# Patient Record
Sex: FEMALE | Race: ASIAN
[De-identification: names, ages, dates, MRNs, and addresses within clinical notes are randomized per-mention and may not be internally consistent; named-entity substitution may affect disease eponyms.]

---

## 2017-05-31 ENCOUNTER — HOSPITAL ENCOUNTER (OUTPATIENT)
Dept: HOSPITAL 45 - C.EDA | Age: 77
Setting detail: OBSERVATION
LOS: 2 days | Discharge: HOME HEALTH | End: 2017-06-02
Attending: HOSPITALIST | Admitting: HOSPITALIST
Payer: COMMERCIAL

## 2017-05-31 VITALS
TEMPERATURE: 97.34 F | DIASTOLIC BLOOD PRESSURE: 66 MMHG | OXYGEN SATURATION: 100 % | SYSTOLIC BLOOD PRESSURE: 109 MMHG | HEART RATE: 59 BPM

## 2017-05-31 VITALS
HEIGHT: 55 IN | WEIGHT: 69.89 LBS | WEIGHT: 69.89 LBS | HEIGHT: 55 IN | BODY MASS INDEX: 16.17 KG/M2 | BODY MASS INDEX: 16.17 KG/M2

## 2017-05-31 VITALS
HEART RATE: 67 BPM | DIASTOLIC BLOOD PRESSURE: 80 MMHG | SYSTOLIC BLOOD PRESSURE: 136 MMHG | TEMPERATURE: 97.52 F | OXYGEN SATURATION: 100 %

## 2017-05-31 DIAGNOSIS — E53.8: ICD-10-CM

## 2017-05-31 DIAGNOSIS — R63.4: ICD-10-CM

## 2017-05-31 DIAGNOSIS — E55.9: ICD-10-CM

## 2017-05-31 DIAGNOSIS — R10.9: ICD-10-CM

## 2017-05-31 DIAGNOSIS — Z90.710: ICD-10-CM

## 2017-05-31 DIAGNOSIS — Z79.899: ICD-10-CM

## 2017-05-31 DIAGNOSIS — R51: Primary | ICD-10-CM

## 2017-05-31 DIAGNOSIS — R11.2: ICD-10-CM

## 2017-05-31 DIAGNOSIS — Z90.49: ICD-10-CM

## 2017-05-31 DIAGNOSIS — R42: ICD-10-CM

## 2017-05-31 LAB
ALBUMIN/GLOB SERPL: 1 {RATIO} (ref 0.9–2)
ALP SERPL-CCNC: 63 U/L (ref 45–117)
ALT SERPL-CCNC: 27 U/L (ref 12–78)
ANION GAP SERPL CALC-SCNC: 11 MMOL/L (ref 3–11)
APPEARANCE UR: CLEAR
AST SERPL-CCNC: (no result) U/L (ref 15–37)
AST SERPL-CCNC: (no result) U/L (ref 15–37)
BASOPHILS # BLD: 0.03 K/UL (ref 0–0.2)
BASOPHILS NFR BLD: 0.3 %
BILIRUB UR-MCNC: (no result) MG/DL
BUN SERPL-MCNC: 13 MG/DL (ref 7–18)
BUN/CREAT SERPL: 15.6 (ref 10–20)
CALCIUM SERPL-MCNC: 9.2 MG/DL (ref 8.5–10.1)
CHLORIDE SERPL-SCNC: 108 MMOL/L (ref 98–107)
CO2 SERPL-SCNC: 23 MMOL/L (ref 21–32)
COLOR UR: YELLOW
COMPLETE: YES
CREAT CL PREDICTED SERPL C-G-VRATE: 30.2 ML/MIN
CREAT SERPL-MCNC: 0.85 MG/DL (ref 0.6–1.2)
EOSINOPHIL NFR BLD AUTO: 260 K/UL (ref 130–400)
GLOBULIN SER-MCNC: 4.3 GM/DL (ref 2.5–4)
GLUCOSE SERPL-MCNC: 107 MG/DL (ref 70–99)
HCT VFR BLD CALC: 41.3 % (ref 37–47)
IG%: 0.3 %
IMM GRANULOCYTES NFR BLD AUTO: 16.3 %
INR PPP: 0.9 (ref 0.9–1.1)
LYMPHOCYTES # BLD: 1.54 K/UL (ref 1.2–3.4)
MANUAL MICROSCOPIC REQUIRED?: NO
MCH RBC QN AUTO: 32.1 PG (ref 25–34)
MCHC RBC AUTO-ENTMCNC: 32.7 G/DL (ref 32–36)
MCV RBC AUTO: 98.3 FL (ref 80–100)
MONOCYTES NFR BLD: 5 %
NEUTROPHILS # BLD AUTO: 0.7 %
NEUTROPHILS NFR BLD AUTO: 77.4 %
NITRITE UR QL STRIP: (no result)
PARTIAL THROMBOPLASTIN RATIO: 0.9
PH UR STRIP: 5.5 [PH] (ref 4.5–7.5)
PMV BLD AUTO: 9.4 FL (ref 7.4–10.4)
POTASSIUM SERPL-SCNC: (no result) MMOL/L (ref 3.5–5.1)
POTASSIUM SERPL-SCNC: (no result) MMOL/L (ref 3.5–5.1)
POTASSIUM SERPL-SCNC: 3.6 MMOL/L (ref 3.5–5.1)
PROTHROMBIN TIME: 10 SECONDS (ref 9–12)
RBC # BLD AUTO: 4.2 M/UL (ref 4.2–5.4)
REVIEW REQ?: NO
SODIUM SERPL-SCNC: 142 MMOL/L (ref 136–145)
SP GR UR STRIP: 1.02 (ref 1–1.03)
TSH SERPL-ACNC: 1.05 UIU/ML (ref 0.3–4.5)
URINE BILL WITH OR WITHOUT MIC: (no result)
UROBILINOGEN UR-MCNC: (no result) MG/DL
WBC # BLD AUTO: 9.43 K/UL (ref 4.8–10.8)
ZZUR CULT IF INDIC CLEAN CATCH: NO

## 2017-05-31 RX ADMIN — CHOLESTYRAMINE SCH GM: 4 POWDER, FOR SUSPENSION ORAL at 22:00

## 2017-05-31 RX ADMIN — SODIUM CHLORIDE SCH MLS/HR: 900 INJECTION, SOLUTION INTRAVENOUS at 22:21

## 2017-05-31 NOTE — DIAGNOSTIC IMAGING REPORT
MR ANGIOGRAPHY OF THE [place] [facility] [place] NO CONTRAST



CLINICAL HISTORY: Acute stroke    



COMPARISON STUDY: None.



A 3-D time-of-flight MR angiographic sequence of the [place] [facility] [place] was

performed. Both the source and projection images were reviewed.



There is no evidence of major intracranial branch occlusion. There is no

evidence of intracranial stenosis. There are no lesions suspicious for aneurysm.



IMPRESSION: Unremarkable MR angiography of the [place].







Electronically signed by:  Cory Ojeda M.D.

5/31/2017 9:05 PM



Dictated Date/Time:  5/31/2017 9:03 PM

## 2017-05-31 NOTE — DIAGNOSTIC IMAGING REPORT
SINGLE VIEW CHEST



CLINICAL HISTORY:  Generalized weakness. Change in mental status.



FINDINGS: An AP, portable, upright chest radiograph is compared to study dated

8/5/2014. Correlation is made with chest CT dated 1/30/2014. The examination is

degraded by portable technique and patient rotation.  The heart is top normal

for projection and there is mild atherosclerotic calcification of the thoracic

aorta. Emphysema and chronic interstitial thickening are similar to previous. No

airspace consolidation or pleural effusion is identified. No pneumothorax is

seen. The skeletal structures are osteopenic. The bony thorax is grossly intact.



IMPRESSION: Emphysema with no acute cardiopulmonary abnormality.







Electronically signed by:  Sundar Krishnan M.D.

5/31/2017 4:40 PM



Dictated Date/Time:  5/31/2017 4:38 PM

## 2017-05-31 NOTE — EMERGENCY ROOM VISIT NOTE
History


Report prepared by Lori:  Christy Paulino


Under the Supervision of:  Dr. Sundar Cason M.D.


First contact with patient:  15:43


Chief Complaint:  VOMITING


Stated Complaint:  NAUSEA, VOMITING, HEADACHE, DIZZINESS





History of Present Illness


The patient is a 76 year old female who presents to the Emergency Room with 

complaints of persistent dizziness that began prior to arrival.  The patient 

reports that she was outside gardening today and became dizzy and shaky.  She 

states that she went into her house and began vomiting.  The patient has 

additionally associated eye pain, visual changes, chest pain, shortness of 

breath, and a headache.  She denies any loss of consciousness or head trauma.  

The patient states that she felt okay prior to gardening today, and reports 

eating lunch and breakfast.  She denies any recent illness.  The patient denies 

any abdominal pain





   Source of History:  patient


   Onset:  prior to arrival


   Position:  other (global)


   Quality:  other (dizziness)


   Timing:  other (persistent)


   Associated Symptoms:  + SOB, + chest pain, + headache, + vomiting, No LOC, 

No abdominal pain


Note:


Associated Symptoms: headache, eye pain, visual changes, shaky





Review of Systems


See HPI for pertinent positives & negatives. A total of 10 systems reviewed and 

were otherwise negative.





Past Medical & Surgical


Medical Problems:


(1) Abdominal adhesions


(2) Biliary colic


(3) Cholelithiasis


(4) SBO (small bowel obstruction)


Surgical Problems:


(1) S/P appendectomy


(2) S/P hysterectomy








Family History





Cancer





Social History


Smoking Status:  Never Smoker


Marital Status:  


Housing Status:  lives with family


Occupation Status:  retired





Current/Historical Medications


Scheduled


Alendronate Sodium (Alendronate Sodium), 70 MG PO WK


Ascorbic Acid (Ascorbic Acid), 1,000 MG PO DAILY


Cholecalciferol (Vitamin D 1000 Unit), 1,000 INTER.UNIT PO DAILY


Cholestyramine (Cholestyramine), 4 GM PO BID


Cyanocobalamin (Vitamin B12), 1,000 MCG PO DAILY


Estradiol (Estradiol Transdermal System), 1 PATCH TOP WK


Multiple Vitamins W/ Minerals (Womens 50+ Multi Vitamin), 1 TAB PO DAILY





Scheduled PRN


Butalbital-Acetaminophen-Caffe (Fioricet), 1 CAP PO TID PRN for Headache


Meloxicam (Mobic), 7.5 MG PO DAILY PRN for Pain


Methocarbamol (Methocarbamol), 375 MG PO HS PRN for Muscle Relaxer





Allergies


Coded Allergies:  


     Tetracycline (Verified  Allergy, Mild, UNKNOWN, 10/10/14)


     Codeine (Verified  Allergy, Unknown, UNKNOWN, 10/10/14)


     Sulfa Drugs (Verified  Allergy, Unknown, UNKNOWN, 10/10/14)


     Tramadol (Verified  Allergy, Unknown, UNKNOWN, 10/10/14)


     Aspirin (Verified  Adverse Reaction, Mild, GI UPSET, 10/2/14)


     NSAIDs (Verified  Adverse Reaction, Mild, GI UPSET, 10/10/14)





Physical Exam


Vital Signs











  Date Time  Temp Pulse Resp B/P Pulse Ox O2 Delivery O2 Flow Rate FiO2


 


5/31/17 18:53  73 16 111/53 100 Room Air  


 


5/31/17 17:52  75 18 113/51 100 Room Air  


 


5/31/17 17:25  72 14 125/46 100 Room Air  


 


5/31/17 16:09  73      


 


5/31/17 15:58 36.4 70 17 137/94 100 Room Air  


 


5/31/17 15:54     98 Room Air  











Physical Exam


GENERAL: Patient is in moderate distress secondary to vomiting.


HEENT: No acute trauma, normocephalic atraumatic, mucous membranes moist, no 

nasal congestion, no scleral icterus.


NECK: No stridor, no adenopathy, no meningismus, trachea is midline.


LUNGS: Clear to auscultation bilaterally, no wheeze, no rhonchi, breath sounds 

equal.


HEART: Without murmurs gallops or rubs, regular rate and rhythm.


ABDOMEN: Soft, nontender, bowel sounds positive, no hernias, no peritonitis.


EXTREMITIES: No cyanosis or edema, full range of motion of all the joints 

without pain or difficulty, no signs for acute trauma.


NEUROLOGIC: Oriented x 3, no acute motor or sensory deficits, no focal weakness.


SKIN: No rash, no jaundice, no diaphoresis.





Medical Decision & Procedures


ER Provider


Diagnostic Interpretation:


Radiology results as stated below per my review and radiologist interpretation:





CT SCAN OF THE BRAIN WITHOUT IV CONTRAST





CLINICAL HISTORY: Generalized weakness. Change in mental status.





COMPARISON STUDY:  MRI of the brain dated 5/11/2016.





TECHNIQUE: Unenhanced axial CT scan of the brain is performed from the vertex to


the skull base.





CT DOSE: 537.48 mGy.cm





FINDINGS:





Brain parenchyma: There are age-related involutional changes noting  mild


subcortical and periventricular microangiopathic change. There is no hemorrhage,


mass effect, or evidence of acute territorial ischemia by CT criteria.


Mineralization is noted in the basal ganglia. Gray-white matter is preserved. No


extra-axial fluid collection is seen.





Ventricles, sulci, cisterns: Prominent secondary to involutional change.





Intracranial vasculature: There is atherosclerotic calcification of the


cavernous carotid arteries.





Calvarium: Unremarkable.





Sinuses and mastoids: The visualized paranasal sinuses are clear. The mastoid


air cells are well pneumatized.





Orbits: The bony orbits are grossly intact. There is evidence of bilateral


ocular lens surgery.








IMPRESSION: There is no hemorrhage, mass effect, or evidence of acute


territorial ischemia by CT criteria.





Electronically signed by:  Sundar Krishnan M.D.


5/31/2017 4:24 PM





Dictated Date/Time:  5/31/2017 4:21 PM








SINGLE VIEW CHEST





CLINICAL HISTORY:  Generalized weakness. Change in mental status.





FINDINGS: An AP, portable, upright chest radiograph is compared to study dated


8/5/2014. Correlation is made with chest CT dated 1/30/2014. The examination is


degraded by portable technique and patient rotation.  The heart is top normal


for projection and there is mild atherosclerotic calcification of the thoracic


aorta. Emphysema and chronic interstitial thickening are similar to previous. No


airspace consolidation or pleural effusion is identified. No pneumothorax is


seen. The skeletal structures are osteopenic. The bony thorax is grossly intact.





IMPRESSION: Emphysema with no acute cardiopulmonary abnormality.





Electronically signed by:  Sundar Krishnan M.D.


5/31/2017 4:40 PM





Dictated Date/Time:  5/31/2017 4:38 PM





Laboratory Results


5/31/17 16:00








Red Blood Count 4.20, Mean Corpuscular Volume 98.3, Mean Corpuscular Hemoglobin 

32.1, Mean Corpuscular Hemoglobin Concent 32.7, Mean Platelet Volume 9.4, 

Neutrophils (%) (Auto) 77.4, Lymphocytes (%) (Auto) 16.3, Monocytes (%) (Auto) 

5.0, Eosinophils (%) (Auto) 0.7, Basophils (%) (Auto) 0.3, Neutrophils # (Auto) 

7.29, Lymphocytes # (Auto) 1.54, Monocytes # (Auto) 0.47, Eosinophils # (Auto) 

0.07, Basophils # (Auto) 0.03





5/31/17 16:00








5/31/17 18:11

















Test


  5/31/17


16:00 5/31/17


17:17 5/31/17


18:11


 


White Blood Count


  9.43 K/uL


(4.8-10.8) 


  


 


 


Red Blood Count


  4.20 M/uL


(4.2-5.4) 


  


 


 


Hemoglobin


  13.5 g/dL


(12.0-16.0) 


  


 


 


Hematocrit 41.3 % (37-47)   


 


Mean Corpuscular Volume


  98.3 fL


() 


  


 


 


Mean Corpuscular Hemoglobin


  32.1 pg


(25-34) 


  


 


 


Mean Corpuscular Hemoglobin


Concent 32.7 g/dl


(32-36) 


  


 


 


Platelet Count


  260 K/uL


(130-400) 


  


 


 


Mean Platelet Volume


  9.4 fL


(7.4-10.4) 


  


 


 


Neutrophils (%) (Auto) 77.4 %   


 


Lymphocytes (%) (Auto) 16.3 %   


 


Monocytes (%) (Auto) 5.0 %   


 


Eosinophils (%) (Auto) 0.7 %   


 


Basophils (%) (Auto) 0.3 %   


 


Neutrophils # (Auto)


  7.29 K/uL


(1.4-6.5) 


  


 


 


Lymphocytes # (Auto)


  1.54 K/uL


(1.2-3.4) 


  


 


 


Monocytes # (Auto)


  0.47 K/uL


(0.11-0.59) 


  


 


 


Eosinophils # (Auto)


  0.07 K/uL


(0-0.5) 


  


 


 


Basophils # (Auto)


  0.03 K/uL


(0-0.2) 


  


 


 


RDW Standard Deviation


  46.5 fL


(36.4-46.3) 


  


 


 


RDW Coefficient of Variation


  12.8 %


(11.5-14.5) 


  


 


 


Immature Granulocyte % (Auto) 0.3 %   


 


Immature Granulocyte # (Auto)


  0.03 K/uL


(0.00-0.02) 


  


 


 


Prothrombin Time


  10.0 SECONDS


(9.0-12.0) 


  


 


 


Prothromb Time International


Ratio 0.9 (0.9-1.1) 


  


  


 


 


Activated Partial


Thromboplast Time 22.7 SECONDS


(21.0-31.0) 


  


 


 


Partial Thromboplastin Ratio 0.9   


 


Anion Gap


  11.0 mmol/L


(3-11) 


  


 


 


Est Creatinine Clear Calc


Drug Dose 30.2 ml/min 


  


  


 


 


Estimated GFR (


American) 77.7 


  


  


 


 


Estimated GFR (Non-


American 67.0 


  


  


 


 


BUN/Creatinine Ratio 15.6 (10-20)   


 


Calcium Level


  9.2 mg/dl


(8.5-10.1) 


  


 


 


Total Bilirubin


  0.7 mg/dl


(0.2-1) 


  


 


 


Alanine Aminotransferase


(ALT/SGPT) 27 U/L (12-78) 


  


  


 


 


Alkaline Phosphatase


  63 U/L


() 


  


 


 


Troponin I


  0.017 ng/ml


(0-0.045) 


  


 


 


Total Protein


  8.5 gm/dl


(6.4-8.2) 


  


 


 


Albumin


  4.2 gm/dl


(3.4-5.0) 


  


 


 


Globulin


  4.3 gm/dl


(2.5-4.0) 


  


 


 


Albumin/Globulin Ratio 1.0 (0.9-2)   


 


Thyroid Stimulating Hormone


(TSH) 1.050 uIu/ml


(0.300-4.500) 


  


 


 


Aspartate Amino Transf


(AST/SGOT) 


   U/L (15-37) 


  


 


 


Chemistry Specimen Hemolysis    








Laboratory results reviewed by me.





Medications Administered











 Medications


  (Trade)  Dose


 Ordered  Sig/Albaro


 Route  Start Time


 Stop Time Status Last Admin


Dose Admin


 


 Sodium Chloride


  (Nss 1000ml)  500 ml @ 


 999 mls/hr  Q31M STAT


 IV  5/31/17 15:47


 5/31/17 16:17 DC 5/31/17 16:36


999 MLS/HR


 


 Ondansetron HCl


  (Zofran Inj)  4 mg  NOW  STAT


 IV  5/31/17 15:47


 5/31/17 15:51 DC 5/31/17 16:38


4 MG


 


 Lorazepam


  (Ativan Inj)  0.5 mg  NOW  STAT


 IV  5/31/17 15:47


 5/31/17 15:51 DC 5/31/17 16:38


0.5 MG











ECG


Indication:  chest pain


Rate (beats per minute):  78


Rhythm:  normal sinus


Findings:  no acute ischemic change, no ectopy, other (old inferior infarct)





ED Course


1544: The patient was evaluated in room A11B. A complete history and physical 

exam was performed.





1547: Ordered Ativan Inj 0.5 mg IV, Sodium Chloride 1000 ml @ 200 mls/hr IV, 

Zofran Inj 4 mg IV, Sodium Chloride 500 ml @ 999 mls/hr IV.





1801: I reevaluated the patient and she is feeling better, but still cannot get 

up and move around without having symptoms.  I discussed all the exam findings 

with her and I discussed the treatment plan.  She verbalized complete 

understanding and agreement.  She will be evaluated for further treatment.





1808: I discussed the patients case with MELVINA De Paz.  She is going to 

evaluate the patient for further treatment.





Medical Decision


The patient is a 76 year old female who presents to the ED with complaints of 

dizziness.  Differential diagnoses considered include cardiac ischemia, MI, 

vertigo, intracranial bleed, migraine, dehydration, exhaustion, electrolyte 

imbalance, anemia.





There is no leukocytosis or concerning anemia.  No significant electrolyte 

abnormality, kidney failure or hepatitis.  The patient appears to be in a 

euthyroid state.  Brain CT shows no acute bleed or mass effect.  EKG shows a 

normal sinus rhythm, no acute ischemia.  Cardiac enzyme testing times one is 

not consistent with acute cardiac injury.  Chest x-ray does not show 

mediastinal widening, pneumonia or pneumothorax.





Patient received IV saline, IV Zofran and IV Ativan, she feels better but still 

is very off balance and weak.  Any movement causes her feelings of balance and 

dizziness to worsen, she then becomes nauseated as well.





The patient likely has vertigo, posterior circulation stroke is also 

consideration, although, her history and exam does not warrant TPA 

administration.  Given her findings, given her exam, given her complaints, 

further workup in the hospital was felt warranted.  I did speak to the patient 

in to case management.  The on-call hospitalist was consulted.

















Medication Reconciliation: I attest that I have personally reviewed the patient'

s current medication list.





Blood Pressure Screening: Patient was found to have an elevated blood pressure 

and was referred to their primary doctor for recheck and further treatment.





Consults


Time Called:  1805


Consulting Physician:  MELVINA De Paz


Returned Call:  1808


I discussed the patients case with MELVINA De Paz.  She is going to 

evaluate the patient for further treatment.





Impression





 Primary Impression:  


 Stroke-like symptoms


 Additional Impressions:  


 Precordial chest pain


 Vomiting





Scribe Attestation


The scribe's documentation has been prepared under my direction and personally 

reviewed by me in its entirety. I confirm that the note above accurately 

reflects all work, treatment, procedures, and medical decision making performed 

by me.





Stroke t-PA Criteria Reviewed


Does NOT meet criteria for t-PA





Reason t-PA Not Given


Treatment not indicated





Departure Information


Dispostion


Being Evaluated By Hospitalist





Referrals


Олег Polo M.D. (PCP)





Problem Qualifiers

## 2017-05-31 NOTE — HISTORY AND PHYSICAL
History & Physical


Date & Time of Service:


May 31, 2017 at 19:00


Chief Complaint:


Nausea, Vomiting, Headache, Dizziness


Primary Care Physician:


Олег Polo M.D.


History of Present Illness


Source:  patient, spouse


75 y/o F who was brought her after her  called the ambulance for 

multiple sx.  Pt was outside for about 2 hours trimming tree branches and other 

yard work when she suddenly ran into the house with sudden onset of emesis.  

 states that pt was cold and clammy on her forehead "like a corpse".  He 

states that she seemed off balance.  is a former  and tried to 

assess pt for heat stroke as he states she rarely drinks much and will 

frequently go outside without proper hydration.  He states that today she was 

outside for longer than usual working.  Pt states that her vision became dark 

and she was lightheaded. She did not pass out, but  was concerned that 

she was about to and called EMS.  Pt states she has a feeling like there is 

something stuck in her chest and she needs to get it out.  She is having 

difficulty describing this, but is clear that it is not pain.   states 

that she was having trouble catching her breath after several episodes of 

emesis.  He tried to put her to bed, but the ongoing emesis prevented that.  Pt 

has never felt like this in the past.  Currently, she has a frontal headache.  

She states she does not generally get headaches like this.  Fiorecet is listed 

in her medications, but neither she nor her  are aware of this 

medication. 





Pt denies fever, SOB, chest pain, abd pain, n/v/c/d, LE pain or swelling.





ROS as noted above, otherwise neg.





During our discussion, pt's daughter called and informed  that friends 

of pt relayed to her last week that pt had mentioned that she had not eaten in 

72hrs at that time.   states that she will intermittently fast or 

partially fast at times.  Her diet is mostly rice, seaweed, bagels.  Apparently 

her PCP has been working with her to increase protein, however pt prefers a 

more plant based diet.  states that due to his PTSD they have not 

interacted much the last few days and he is unsure of what pt has eaten 

recently.  She relays that she had a bagel this AM.   does remember that 

yesterday pt seemed more "out of it" and less interactive.  He was having PTSD 

issues and removed himself from further discussion with her "because I don't 

handle conflict well".





Most of the hx is from her  as pt is not a native English speaker and is 

having a hard time staying focused on questioning.





Past Medical/Surgical History


Medical Problems:


(1) Abdominal adhesions


Status: Resolved  





(2) Biliary colic


Status: Resolved  





(3) Cholelithiasis


Status: Chronic  





(4) SBO (small bowel obstruction)


Status: Resolved  





Surgical Problems:


(1) S/P appendectomy


Status: Resolved  





(2) S/P hysterectomy


Status: Resolved  











Family History





Family history was reviewed; no changes noted.





Social History


Smoking Status:  Never Smoker


Alcohol Use:  none


Drug Use:  none


Marital Status:  


Occupational Status:  retired





Multi-Drug Resistant Organisms


History of MDRO:  No





Allergies


Coded Allergies:  


     Tetracycline (Verified  Allergy, Mild, UNKNOWN, 10/10/14)


     Codeine (Verified  Allergy, Unknown, UNKNOWN, 10/10/14)


     Sulfa Drugs (Verified  Allergy, Unknown, UNKNOWN, 10/10/14)


     Tramadol (Verified  Allergy, Unknown, UNKNOWN, 10/10/14)


     Aspirin (Verified  Adverse Reaction, Mild, GI UPSET, 10/2/14)


     NSAIDs (Verified  Adverse Reaction, Mild, GI UPSET, 10/10/14)





Home Medications


Scheduled


Alendronate Sodium (Alendronate Sodium), 70 MG PO WK


Ascorbic Acid (Ascorbic Acid), 1,000 MG PO DAILY


Cholecalciferol (Vitamin D 1000 Unit), 1,000 INTER.UNIT PO DAILY


Cholestyramine (Cholestyramine), 4 GM PO BID


Cyanocobalamin (Vitamin B12), 1,000 MCG PO DAILY


Estradiol (Estradiol Transdermal System), 1 PATCH TOP WK


Multiple Vitamins W/ Minerals (Womens 50+ Multi Vitamin), 1 TAB PO DAILY





Scheduled PRN


Butalbital-Acetaminophen-Caffe (Fioricet), 1 CAP PO TID PRN for Headache


Meloxicam (Mobic), 7.5 MG PO DAILY PRN for Pain


Methocarbamol (Methocarbamol), 375 MG PO HS PRN for Muscle Relaxer





Physical Exam


Vital Signs











  Date Time  Temp Pulse Resp B/P Pulse Ox O2 Delivery O2 Flow Rate FiO2


 


5/31/17 18:53  73 16 111/53 100 Room Air  


 


5/31/17 17:52  75 18 113/51 100 Room Air  


 


5/31/17 17:25  72 14 125/46 100 Room Air  


 


5/31/17 16:09  73      


 


5/31/17 15:58 36.4 70 17 137/94 100 Room Air  


 


5/31/17 15:54     98 Room Air  








General Appearance:  no apparent distress, + thin


Head:  normocephalic, atraumatic


Respiratory/Chest:  normal breath sounds, no respiratory distress


Cardiovascular:  regular rate, rhythm, no edema


Abdomen/GI:  non tender, soft


Extremities/Musculoskelatal:  no calf tenderness, no pedal edema


Neurologic/Psych:  CNs II-XII nml as tested, alert, oriented x 3, + pertinent 

finding (=  strength b/l)


Skin:  normal color, warm/dry





Diagnostics


Laboratory Results





Results Past 24 Hours








Test


  5/31/17


16:00 5/31/17


17:17 5/31/17


18:11 Range/Units


 


 


White Blood Count 9.43   4.8-10.8  K/uL


 


Red Blood Count 4.20   4.2-5.4  M/uL


 


Hemoglobin 13.5   12.0-16.0  g/dL


 


Hematocrit 41.3   37-47  %


 


Mean Corpuscular Volume 98.3     fL


 


Mean Corpuscular Hemoglobin 32.1   25-34  pg


 


Mean Corpuscular Hemoglobin


Concent 32.7


  


  


  32-36  g/dl


 


 


Platelet Count 260   130-400  K/uL


 


Mean Platelet Volume 9.4   7.4-10.4  fL


 


Neutrophils (%) (Auto) 77.4    %


 


Lymphocytes (%) (Auto) 16.3    %


 


Monocytes (%) (Auto) 5.0    %


 


Eosinophils (%) (Auto) 0.7    %


 


Basophils (%) (Auto) 0.3    %


 


Neutrophils # (Auto) 7.29   1.4-6.5  K/uL


 


Lymphocytes # (Auto) 1.54   1.2-3.4  K/uL


 


Monocytes # (Auto) 0.47   0.11-0.59  K/uL


 


Eosinophils # (Auto) 0.07   0-0.5  K/uL


 


Basophils # (Auto) 0.03   0-0.2  K/uL


 


RDW Standard Deviation 46.5   36.4-46.3  fL


 


RDW Coefficient of Variation 12.8   11.5-14.5  %


 


Immature Granulocyte % (Auto) 0.3    %


 


Immature Granulocyte # (Auto) 0.03   0.00-0.02  K/uL


 


Prothrombin Time


  10.0


  


  


  9.0-12.0


SECONDS


 


Prothromb Time International


Ratio 0.9


  


  


  0.9-1.1  


 


 


Activated Partial


Thromboplast Time 22.7


  


  


  21.0-31.0


SECONDS


 


Partial Thromboplastin Ratio 0.9    


 


Sodium Level 142   136-145  mmol/L


 


Potassium Level    3.5-5.1  mmol/L


 


Chloride Level 108     mmol/L


 


Carbon Dioxide Level 23   21-32  mmol/L


 


Anion Gap 11.0   3-11  mmol/L


 


Blood Urea Nitrogen 13   7-18  mg/dl


 


Creatinine


  0.85


  


  


  0.60-1.20


mg/dl


 


Est Creatinine Clear Calc


Drug Dose 30.2


  


  


   ml/min


 


 


Estimated GFR (


American) 77.7


  


  


   


 


 


Estimated GFR (Non-


American 67.0


  


  


   


 


 


BUN/Creatinine Ratio 15.6   10-20  


 


Random Glucose 107   70-99  mg/dl


 


Calcium Level 9.2   8.5-10.1  mg/dl


 


Total Bilirubin 0.7   0.2-1  mg/dl


 


Aspartate Amino Transf


(AST/SGOT) 


  


  


  15-37  U/L


 


 


Alanine Aminotransferase


(ALT/SGPT) 27


  


  


  12-78  U/L


 


 


Alkaline Phosphatase 63     U/L


 


Troponin I 0.017   0-0.045  ng/ml


 


Total Protein 8.5   6.4-8.2  gm/dl


 


Albumin 4.2   3.4-5.0  gm/dl


 


Globulin 4.3   2.5-4.0  gm/dl


 


Albumin/Globulin Ratio 1.0   0.9-2  


 


Thyroid Stimulating Hormone


(TSH) 1.050


  


  


  0.300-4.500


uIu/ml











Diagnostic Radiology


CXR neg for acute


CT head neg for acute





Impression


Assessment and Plan


75 y/o F who was admitted for observation on 5/31 for stroke like sx





Stroke like sx: Uncertain etiology, seems most likely possible heat stroke in 

the setting of dehydration vs CVA vs ACS


   Feeling improved s/p IVF


   Electrolytes WNL (K pending)


   CBC WNL


   CT head neg for acute


   CXR neg for acute


   MRI/MRA/carotid US/ECHO pending


   Trop neg x1, serials pending


   Will hold on c/s given multiple possible etiologies as noted above





Vitamin D/B12 deficiency: continue home meds


   


Pt is very low risk for CVA and ACS, however sx are concerning for posterior 

circulation issues given visual changes, also women have atypical MI 

presentation.  I did discuss this with  at length.





Level of Care


Telemetry





Resuscitation Status


FULL RESUSCITATION





VTE Prophylaxis


VTE Risk Assessment Done? Y/N:  Yes


Risk Level:  Low

## 2017-05-31 NOTE — DIAGNOSTIC IMAGING REPORT
MR ANGIOGRAPHY NECK WITHOUT A WITH CONTRAST



HISTORY:      Stroke



TECHNIQUE: Time-of-flight and gadolinium-enhanced MRA of the neck was performed

both before and after the intravenous administration of contrast. All

measurements were calculated based on NASCET criteria.



COMPARISON STUDY:  None.



FINDINGS: The aortic arch and proximal great vessels are widely patent.  There

is no significant stenosis, occlusion, or dissection identified within the

bilateral common carotid, internal carotid, or vertebral arteries.    



IMPRESSION:  

No significant stenosis, occlusion, or dissection identified within the carotid

or vertebral arteries. 







Electronically signed by:  Cory Ojeda M.D.

5/31/2017 10:17 PM



Dictated Date/Time:  5/31/2017 10:15 PM

## 2017-05-31 NOTE — DIAGNOSTIC IMAGING REPORT
MRI OF THE BRAIN WITHOUT AND WITH IV CONTRAST



CLINICAL HISTORY: Stroke NAUSEA VOMITING AND HEADACHE.



COMPARISON STUDY:  Head CT dated 5/31/2017 , MRI the brain dated 5/11/2016



TECHNIQUE: MRI of the brain was performed from the vertex to the skull base

utilizing various T1 and T2 weighted sequences. Following the IV administration

of 3 mL of Gadavist contrast, additional enhanced images were obtained.



FINDINGS: 

Sagittal T1, axial diffusion, proton density and T2 weighted axial, coronal

FLAIR, and pre and post axial T1-weighted images were acquired. These were

supplemented with post gadolinium coronal T1 weighted images. 

No intra or extra-axial mass lesions are visualized.

Axial diffusion-weighted images reveal no evidence of acute or subacute

infarction.

There is no evidence of ventricular dilatation.

Proton density T2-weighted and FLAIR images reveal minimal foci of increased T2

signal within the white matter, likely on a small vessel basis. Atrophic changes

are again noted.

There are no abnormal flow voids.

There is no evidence of pathologic enhancement.





IMPRESSION:  No acute intracranial findings. No evidence of intracranial mass.

No evidence of acute or subacute infarction.







Electronically signed by:  Cory Ojeda M.D.

5/31/2017 10:14 PM



Dictated Date/Time:  5/31/2017 10:11 PM

## 2017-05-31 NOTE — DIAGNOSTIC IMAGING REPORT
CT SCAN OF THE BRAIN WITHOUT IV CONTRAST



CLINICAL HISTORY: Generalized weakness. Change in mental status.



COMPARISON STUDY:  MRI of the brain dated 5/11/2016.



TECHNIQUE: Unenhanced axial CT scan of the brain is performed from the vertex to

the skull base.



CT DOSE: 537.48 mGy.cm



FINDINGS:



Brain parenchyma: There are age-related involutional changes noting  mild

subcortical and periventricular microangiopathic change. There is no hemorrhage,

mass effect, or evidence of acute territorial ischemia by CT criteria.

Mineralization is noted in the basal ganglia. Gray-white matter is preserved. No

extra-axial fluid collection is seen.



Ventricles, sulci, cisterns: Prominent secondary to involutional change.



Intracranial vasculature: There is atherosclerotic calcification of the

cavernous carotid arteries.



Calvarium: Unremarkable.



Sinuses and mastoids: The visualized paranasal sinuses are clear. The mastoid

air cells are well pneumatized.



Orbits: The bony orbits are grossly intact. There is evidence of bilateral

ocular lens surgery.





IMPRESSION: There is no hemorrhage, mass effect, or evidence of acute

territorial ischemia by CT criteria.







Electronically signed by:  Sundar Krishnan M.D.

5/31/2017 4:24 PM



Dictated Date/Time:  5/31/2017 4:21 PM

## 2017-06-01 VITALS
HEART RATE: 57 BPM | OXYGEN SATURATION: 100 % | DIASTOLIC BLOOD PRESSURE: 65 MMHG | TEMPERATURE: 97.7 F | SYSTOLIC BLOOD PRESSURE: 118 MMHG

## 2017-06-01 VITALS
HEART RATE: 56 BPM | OXYGEN SATURATION: 100 % | TEMPERATURE: 98.06 F | DIASTOLIC BLOOD PRESSURE: 65 MMHG | SYSTOLIC BLOOD PRESSURE: 131 MMHG

## 2017-06-01 VITALS
OXYGEN SATURATION: 99 % | SYSTOLIC BLOOD PRESSURE: 127 MMHG | DIASTOLIC BLOOD PRESSURE: 65 MMHG | HEART RATE: 52 BPM | TEMPERATURE: 97.88 F

## 2017-06-01 VITALS
OXYGEN SATURATION: 100 % | HEART RATE: 54 BPM | DIASTOLIC BLOOD PRESSURE: 61 MMHG | SYSTOLIC BLOOD PRESSURE: 103 MMHG | TEMPERATURE: 98.06 F

## 2017-06-01 VITALS
HEART RATE: 55 BPM | TEMPERATURE: 98.42 F | SYSTOLIC BLOOD PRESSURE: 104 MMHG | DIASTOLIC BLOOD PRESSURE: 57 MMHG | OXYGEN SATURATION: 100 %

## 2017-06-01 LAB
CHOLEST/HDLC SERPL: 1.9 {RATIO}
EST. AVERAGE GLUCOSE BLD GHB EST-MCNC: 100 MG/DL
GLUCOSE UR QL: 68 MG/DL
KETONES UR QL STRIP: 48 MG/DL
NITRITE UR QL STRIP: 76 MG/DL (ref 0–150)
PH UR: 131 MG/DL (ref 0–200)
VERY LOW DENSITY LIPOPROT CALC: 15 MG/DL

## 2017-06-01 RX ADMIN — ONDANSETRON PRN MG: 2 INJECTION INTRAMUSCULAR; INTRAVENOUS at 09:19

## 2017-06-01 RX ADMIN — Medication SCH TAB: at 08:19

## 2017-06-01 RX ADMIN — CHOLESTYRAMINE SCH GM: 4 POWDER, FOR SUSPENSION ORAL at 08:20

## 2017-06-01 RX ADMIN — SODIUM CHLORIDE SCH MLS/HR: 900 INJECTION, SOLUTION INTRAVENOUS at 16:02

## 2017-06-01 RX ADMIN — PANTOPRAZOLE SODIUM SCH MLS/MIN: 40 INJECTION, POWDER, FOR SOLUTION INTRAVENOUS at 21:29

## 2017-06-01 RX ADMIN — ONDANSETRON PRN MG: 2 INJECTION INTRAMUSCULAR; INTRAVENOUS at 16:03

## 2017-06-01 RX ADMIN — OXYCODONE HYDROCHLORIDE AND ACETAMINOPHEN SCH MG: 500 TABLET ORAL at 08:19

## 2017-06-01 RX ADMIN — Medication SCH MCG: at 08:19

## 2017-06-01 RX ADMIN — CHOLESTYRAMINE SCH GM: 4 POWDER, FOR SUSPENSION ORAL at 21:21

## 2017-06-01 RX ADMIN — Medication SCH INTER.UNIT: at 08:19

## 2017-06-01 NOTE — PROGRESS NOTE
Subjective


Date of Service:


Jun 1, 2017.


Subjective


this pt is now complaining of nausea and frontal headache, she states her 

stomach problems are not new and long standing, she requested that I call her 

daughter Maryann who relate to me the fact that she has not been eating for some 

time and also has been losing weight.  maryann states patient has many issues 

with different foods and despite documentation in H&P she really exists on 

boost instead of food and drinks very little other liquids.





Pt is not clear about her stomach issues when asked, today her stomach is not 

tender and she is mostly bothered by nausea and headache





Problem List


Medical Problems:


(1) Precordial chest pain


Status: Acute  





(2) Stroke-like symptoms


Status: Acute  





(3) Vomiting


Status: Acute  











Review of Systems


Constitutional:  + weakness, + fatigue, No fever, No chills


Eyes:  No worsening of vision, No eye pain


ENT:  No hearing loss, No unusual epistaxis, No nasal symptoms, No sore throat, 

No tinnitus


Respiratory:  No cough, No shortness of breath, No dyspnea on exertion


Cardiac:  No chest pain, No orthopnea, No edema


Abdomen:  + nausea, + problem reported (loss of appetitie), No pain, No vomiting

, No diarrhea


Musculoskeletal:  No joint pain, No muscle pain


Neurologic:  No memory loss, No paralysis, No weakness


Psychiatric:  No depression symptoms, No anhedonism





Objective


Vital Signs











  Date Time  Temp Pulse Resp B/P (MAP) Pulse Ox O2 Delivery O2 Flow Rate FiO2


 


6/1/17 12:00      Room Air  


 


6/1/17 11:18 36.9 55 16 104/57 (73) 100 Room Air  


 


6/1/17 08:02 36.7 54 16 103/61 (75) 100 Room Air  


 


6/1/17 08:00      Room Air  


 


6/1/17 04:01 36.5 57 16 118/65 (82) 100 Room Air  


 


6/1/17 04:00      Room Air  


 


5/31/17 23:59      Room Air  


 


5/31/17 23:44 36.3 59 16 109/66 (80) 100 Room Air  


 


5/31/17 22:00 36.4 67 18 136/80 100 Room Air  


 


5/31/17 19:49  80 16 138/70 99 Room Air  


 


5/31/17 18:53  73 16 111/53 100 Room Air  


 


5/31/17 17:52  75 18 113/51 100 Room Air  


 


5/31/17 17:25  72 14 125/46 100 Room Air  


 


5/31/17 16:09  73      


 


5/31/17 15:58 36.4 70 17 137/94 100 Room Air  


 


5/31/17 15:54     98 Room Air  











Physical Exam


General Appearance:  + moderate distress, + thin


Eyes:  PERRL, EOMI


ENT:  hearing grossly normal, pharynx normal


Neck:  supple, no JVD, trachea midline


Respiratory/Chest:  chest non-tender, lungs clear, normal breath sounds


Cardiovascular:  regular rate, rhythm, no murmur


Abdomen:  normal bowel sounds, non tender, soft


Extremities:  no pedal edema, no calf tenderness


Neurologic/Psychiatric:  CNs II-XII nml as tested, alert, oriented x 3





Laboratory Results





Last 24 Hours








Test


  5/31/17


16:00 5/31/17


17:17 5/31/17


18:11 5/31/17


22:15


 


White Blood Count 9.43 K/uL    


 


Red Blood Count 4.20 M/uL    


 


Hemoglobin 13.5 g/dL    


 


Hematocrit 41.3 %    


 


Mean Corpuscular Volume 98.3 fL    


 


Mean Corpuscular Hemoglobin 32.1 pg    


 


Mean Corpuscular Hemoglobin


Concent 32.7 g/dl 


  


  


  


 


 


Platelet Count 260 K/uL    


 


Mean Platelet Volume 9.4 fL    


 


Neutrophils (%) (Auto) 77.4 %    


 


Lymphocytes (%) (Auto) 16.3 %    


 


Monocytes (%) (Auto) 5.0 %    


 


Eosinophils (%) (Auto) 0.7 %    


 


Basophils (%) (Auto) 0.3 %    


 


Neutrophils # (Auto) 7.29 K/uL    


 


Lymphocytes # (Auto) 1.54 K/uL    


 


Monocytes # (Auto) 0.47 K/uL    


 


Eosinophils # (Auto) 0.07 K/uL    


 


Basophils # (Auto) 0.03 K/uL    


 


RDW Standard Deviation 46.5 fL    


 


RDW Coefficient of Variation 12.8 %    


 


Immature Granulocyte % (Auto) 0.3 %    


 


Immature Granulocyte # (Auto) 0.03 K/uL    


 


Prothrombin Time 10.0 SECONDS    


 


Prothromb Time International


Ratio 0.9 


  


  


  


 


 


Activated Partial


Thromboplast Time 22.7 SECONDS 


  


  


  


 


 


Partial Thromboplastin Ratio 0.9    


 


Sodium Level 142 mmol/L    


 


Potassium Level  mmol/L   mmol/L  3.6 mmol/L  


 


Chloride Level 108 mmol/L    


 


Carbon Dioxide Level 23 mmol/L    


 


Anion Gap 11.0 mmol/L    


 


Blood Urea Nitrogen 13 mg/dl    


 


Creatinine 0.85 mg/dl    


 


Est Creatinine Clear Calc


Drug Dose 30.2 ml/min 


  


  


  


 


 


Estimated GFR (


American) 77.7 


  


  


  


 


 


Estimated GFR (Non-


American 67.0 


  


  


  


 


 


BUN/Creatinine Ratio 15.6    


 


Random Glucose 107 mg/dl    


 


Calcium Level 9.2 mg/dl    


 


Total Bilirubin 0.7 mg/dl    


 


Aspartate Amino Transf


(AST/SGOT)  U/L 


   U/L 


  


  


 


 


Alanine Aminotransferase


(ALT/SGPT) 27 U/L 


  


  


  


 


 


Alkaline Phosphatase 63 U/L    


 


Troponin I 0.017 ng/ml    


 


Total Protein 8.5 gm/dl    


 


Albumin 4.2 gm/dl    


 


Globulin 4.3 gm/dl    


 


Albumin/Globulin Ratio 1.0    


 


Thyroid Stimulating Hormone


(TSH) 1.050 uIu/ml 


  


  


  


 


 


Chemistry Specimen Hemolysis     


 


Urine Color    YELLOW 


 


Urine Appearance    CLEAR 


 


Urine pH    5.5 


 


Urine Specific Gravity    1.020 


 


Urine Protein    NEG 


 


Urine Glucose (UA)    NEG 


 


Urine Ketones    1+ 


 


Urine Occult Blood    NEG 


 


Urine Nitrite    NEG 


 


Urine Bilirubin    NEG 


 


Urine Urobilinogen    NEG 


 


Urine Leukocyte Esterase    NEG 


 


Test


  6/1/17


00:24 6/1/17


05:45 6/1/17


09:30 6/1/17


09:52


 


Total Creatine Kinase 51 U/L   62 U/L  


 


Troponin I 0.038 ng/ml   0.027 ng/ml  


 


Estimated Average Glucose  100 mg/dl   


 


Hemoglobin A1c  5.1 %   


 


Triglycerides Level  76 mg/dl   


 


Cholesterol Level  131 mg/dl   


 


HDL Cholesterol  68 mg/dl   


 


LDL Cholesterol, Calculated  48 mg/dl   


 


VLDL Cholesterol, Calculated  15 mg/dl   


 


Cholesterol/HDL Ratio  1.9   


 


Test


  6/1/17


09:55 


  


  


 


 


Influenza Type A Antigen


  Neg for Influ


A 


  


  


 


 


Influenza Type B Antigen


  Neg for Influ


B 


  


  


 











Assessment and Plan


76 F with weakness headache emesis and nausea





PT has negative evaluation for stroke or neurologic origin of headache and 

emesis, including imaging of brain and cerebral circulation.





nausea, questionable etiology, will have on ppi, consider carafate, last seem 

by [facility] GI Dr Kerr, no recent scope





weight loss, does have mildly elevated protein level, maybe from dehydration, 

no abnormal differential but will send SPEP

## 2017-06-01 NOTE — GASTROINTESTINAL CONSULTATION
Gastrointestinal Consultation


Date of Consultation:  Jun 1, 2017


Attending Physician:  Hoang Vieyra


Consulting Physician:  Phyllis Causey


Reason for Consultation:  Weight loss and abd pain


History of Present Illness


Patient is a 76 year old female seen for weight loss and abd pain. Pt was 

initially admitted for nausea, vomiting and HA/dizziness after working in the 

yard. Her  reported that pt doesn't each much, and had been having 

weight loss. In reviewing pt's chart, she had been around 76lbs since 2002, 

currently 69 lbs. 





Pt had hx of multiple abd surgeries including lap cholecystectomy, what sounded 

like ovarian cyst removal from her description and also possibly lysis of 

adhesions by  surgeons. She had hx of abd pain a few years ago which 

had prevented her from eating but denies any now. She denies any trouble w 

chewing food, odynophagia, dysphagia, n/v, abd pain when eating. The only thing 

she reports not being able to eat are dairy products including cheese/yogurts 

as they make her have n/v and constipation. Most of her family members have 

this issues as well thus likely lactose intolerant. Does have diarrhea but 

suspected to be from bile acids post cholecystectomy. This is controlled w 

Questran. She was seen previously by Dr. Jarvis in 5/2015 for the diarrhea

, was also going to be set up for colonoscopy but cancelled. She reports her 

weight was never above 90lbs. She does drink about 2 cans of Boost daily w 

small amts of meals. Sometimes may forget to eat but also admits to be 

depressed given her 's PTSD ([place]et). 





Her labs and imaging studies are reviewed: no signs of leukocytosis, anemia, no 

coagulopathy, Albumin and protein levels unremarkable. She had several brain 

imaging which have been unremarkable. She refused to drink PO contrast for CT 

abd/pelvis eval due to hx of stool incontinence diarrhea w this in the past.





Past Medical/Surgical History


Medical Problems:


(1) Precordial chest pain


Status: Acute  





(2) Stroke-like symptoms


Status: Acute  





(3) Vomiting


Status: Acute  








Past Medical History:


See above.


Past Surgical History:


Multiple abd surgeries





Family History





Cancer





Social History


Smoking Status:  Never Smoker


Drug Use:  none


Marital Status:  


Housing Status:  lives with family


Occupation Status:  retired





Allergies


Coded Allergies:  


     Tetracycline (Verified  Allergy, Mild, UNKNOWN, 10/10/14)


     Codeine (Verified  Allergy, Unknown, UNKNOWN, 10/10/14)


     Sulfa Drugs (Verified  Allergy, Unknown, UNKNOWN, 10/10/14)


     Tramadol (Verified  Allergy, Unknown, UNKNOWN, 10/10/14)


     Aspirin (Verified  Adverse Reaction, Mild, GI UPSET, 10/2/14)


     NSAIDs (Verified  Adverse Reaction, Mild, GI UPSET, 10/10/14)





Current Medications





Home Meds and Scripts








 Medications  Dose


 Route/Sig


 Max Daily Dose Days Date Category Dose


Instructions


 


 Womens 50+ Multi


 Vitamin (Multiple


 Vitamins W/


 Minerals) 1 Tab


 Tab  1 Tab


 PO DAILY


    5/31/17 Reported 


 


 Methocarbamol 750


 Mg Tab  375 Mg


 PO HS PRN


    5/31/17 Reported 


 


 Mobic (Meloxicam)


 7.5 Mg Tab  7.5 Mg


 PO DAILY PRN


    5/31/17 Reported 


 


 Estradiol


 Transdermal


 System


  (Estradiol) 0.05


 Mg/24 Hr Dis  1 Patch


 TOP WK


    5/31/17 Reported  REMOVE PATCH AND APPLY NEW PATCH EVERY SATURDAY


 


 Cholestyramine 4


 Gm Pow  4 Gm


 PO BID


    5/31/17 Reported  MIX THE CONTENTS OF ONE POWDER PACKET WITH 2 TO 6 OUNCES 

ON


 NONCARBONATED BEVERAGE


 


 Alendronate


 Sodium 70 Mg Tab  70 Mg


 PO WK


    5/31/17 Reported  TAKE THIS MEDICATION EVERY FRIDAY


 


 Ascorbic Acid


 1,000 Mg Tab  1,000 Mg


 PO DAILY


    5/31/17 Reported 


 


 Vitamin D 1000


 Unit


  (Cholecalciferol)


 1,000 Unit Cap  1,000 Inter.unit


 PO DAILY


    5/31/17 Reported 


 


 Vitamin B12


  (Cyanocobalamin)


 1,000 Mcg Tab  1,000 Mcg


 PO DAILY


    10/2/14 Reported 


 


 Fioricet


  (Butalbital-Acetaminophen-Caffe)


 1 Cap Cap  1 Cap


 PO TID PRN


    8/4/14 Reported 











Review of Systems


Constitutional:  + weight loss, No fever, No chills


Respiratory:  No cough, No shortness of breath


Cardiac:  No chest pain, No edema


Abdomen:  No pain, No nausea, No vomiting, No diarrhea, No constipation





Physical Exam











  Date Time  Temp Pulse Resp B/P (MAP) Pulse Ox O2 Delivery O2 Flow Rate FiO2


 


6/1/17 12:00      Room Air  


 


6/1/17 11:18 36.9 55 16 104/57 (73) 100 Room Air  


 


6/1/17 08:02 36.7 54 16 103/61 (75) 100 Room Air  


 


6/1/17 08:00      Room Air  


 


6/1/17 04:01 36.5 57 16 118/65 (82) 100 Room Air  


 


6/1/17 04:00      Room Air  


 


5/31/17 23:59      Room Air  


 


5/31/17 23:44 36.3 59 16 109/66 (80) 100 Room Air  


 


5/31/17 22:00 36.4 67 18 136/80 100 Room Air  


 


5/31/17 19:49  80 16 138/70 99 Room Air  


 


5/31/17 18:53  73 16 111/53 100 Room Air  


 


5/31/17 17:52  75 18 113/51 100 Room Air  


 


5/31/17 17:25  72 14 125/46 100 Room Air  


 


5/31/17 16:09  73      


 


5/31/17 15:58 36.4 70 17 137/94 100 Room Air  


 


5/31/17 15:54     98 Room Air  








General Appearance:  no apparent distress, + thin


Eyes:  normal inspection, PERRL, EOMI


Neck:  supple, no JVD, trachea midline


Respiratory/Chest:  normal breath sounds, no respiratory distress, no accessory 

muscle use


Cardiovascular:  regular rate, rhythm, no gallop, no murmur


Abdomen:  normal bowel sounds, non tender, soft


Extremities:  normal inspection, no pedal edema, no calf tenderness


Neurologic/Psych:  alert, normal mood/affect, oriented x 3


Skin:  normal color, no jaundice, no rash





Laboratory Results





Last 24 Hours








Test


  5/31/17


16:00 5/31/17


17:17 5/31/17


18:11 5/31/17


22:15


 


White Blood Count 9.43 K/uL    


 


Red Blood Count 4.20 M/uL    


 


Hemoglobin 13.5 g/dL    


 


Hematocrit 41.3 %    


 


Mean Corpuscular Volume 98.3 fL    


 


Mean Corpuscular Hemoglobin 32.1 pg    


 


Mean Corpuscular Hemoglobin


Concent 32.7 g/dl 


  


  


  


 


 


Platelet Count 260 K/uL    


 


Mean Platelet Volume 9.4 fL    


 


Neutrophils (%) (Auto) 77.4 %    


 


Lymphocytes (%) (Auto) 16.3 %    


 


Monocytes (%) (Auto) 5.0 %    


 


Eosinophils (%) (Auto) 0.7 %    


 


Basophils (%) (Auto) 0.3 %    


 


Neutrophils # (Auto) 7.29 K/uL    


 


Lymphocytes # (Auto) 1.54 K/uL    


 


Monocytes # (Auto) 0.47 K/uL    


 


Eosinophils # (Auto) 0.07 K/uL    


 


Basophils # (Auto) 0.03 K/uL    


 


RDW Standard Deviation 46.5 fL    


 


RDW Coefficient of Variation 12.8 %    


 


Immature Granulocyte % (Auto) 0.3 %    


 


Immature Granulocyte # (Auto) 0.03 K/uL    


 


Prothrombin Time 10.0 SECONDS    


 


Prothromb Time International


Ratio 0.9 


  


  


  


 


 


Activated Partial


Thromboplast Time 22.7 SECONDS 


  


  


  


 


 


Partial Thromboplastin Ratio 0.9    


 


Sodium Level 142 mmol/L    


 


Potassium Level  mmol/L   mmol/L  3.6 mmol/L  


 


Chloride Level 108 mmol/L    


 


Carbon Dioxide Level 23 mmol/L    


 


Anion Gap 11.0 mmol/L    


 


Blood Urea Nitrogen 13 mg/dl    


 


Creatinine 0.85 mg/dl    


 


Est Creatinine Clear Calc


Drug Dose 30.2 ml/min 


  


  


  


 


 


Estimated GFR (


American) 77.7 


  


  


  


 


 


Estimated GFR (Non-


American 67.0 


  


  


  


 


 


BUN/Creatinine Ratio 15.6    


 


Random Glucose 107 mg/dl    


 


Calcium Level 9.2 mg/dl    


 


Total Bilirubin 0.7 mg/dl    


 


Aspartate Amino Transf


(AST/SGOT)  U/L 


   U/L 


  


  


 


 


Alanine Aminotransferase


(ALT/SGPT) 27 U/L 


  


  


  


 


 


Alkaline Phosphatase 63 U/L    


 


Troponin I 0.017 ng/ml    


 


Total Protein 8.5 gm/dl    


 


Albumin 4.2 gm/dl    


 


Globulin 4.3 gm/dl    


 


Albumin/Globulin Ratio 1.0    


 


Thyroid Stimulating Hormone


(TSH) 1.050 uIu/ml 


  


  


  


 


 


Chemistry Specimen Hemolysis     


 


Urine Color    YELLOW 


 


Urine Appearance    CLEAR 


 


Urine pH    5.5 


 


Urine Specific Gravity    1.020 


 


Urine Protein    NEG 


 


Urine Glucose (UA)    NEG 


 


Urine Ketones    1+ 


 


Urine Occult Blood    NEG 


 


Urine Nitrite    NEG 


 


Urine Bilirubin    NEG 


 


Urine Urobilinogen    NEG 


 


Urine Leukocyte Esterase    NEG 


 


Test


  6/1/17


00:24 6/1/17


05:45 6/1/17


09:30 6/1/17


09:52


 


Total Creatine Kinase 51 U/L   62 U/L  


 


Troponin I 0.038 ng/ml   0.027 ng/ml  


 


Estimated Average Glucose  100 mg/dl   


 


Hemoglobin A1c  5.1 %   


 


Triglycerides Level  76 mg/dl   


 


Cholesterol Level  131 mg/dl   


 


HDL Cholesterol  68 mg/dl   


 


LDL Cholesterol, Calculated  48 mg/dl   


 


VLDL Cholesterol, Calculated  15 mg/dl   


 


Cholesterol/HDL Ratio  1.9   


 


Test


  6/1/17


09:55 


  


  


 


 


Influenza Type A Antigen


  Neg for Influ


A 


  


  


 


 


Influenza Type B Antigen


  Neg for Influ


B 


  


  


 











Impression


Patient is a 76 year old female currently seen for abd pain and weight loss. 

Though on eval she denies any issues w abd pain, n/v. Abd exam benign. Her 

weight has been around 76 lbs since 2002, recently dropped to 70 lbs. She 

reports she never weighed more than 90lbs her whole life. Does take up to 2 

cans of Boost for supplements. Admits depressed about her 's PTSD state 

and may not feel like eating or forget to eat. In the past had refused 

endoscopic evaluations.





Plan


- Obtain CT abd/pelvis w IV contrast only. She refused PO contrast use w hx of 

diarrhea, stool incontinence w this


- Questran as dosed for bile acid diarrhea


- Dietician consult for calorie counts, supplements. Lactose free diet


- Trial of Remeron 15mg qHS


- Plans above have been discussed w pt's daughter over phone as well (Maryann [phone])

## 2017-06-01 NOTE — ECHOCARDIOGRAM REPORT
*NOTICE TO RECEIVING PARTY AGENCY**  This information is strictly Confidential and protected under 
[place] law.  [place] law prohibits you from making any further disclosure of this 
information unless further disclosure is expressly permitted by the written consent of the person to 
whom it pertains or is authorized by law.  A general authorization for the release of medical or 
other information is not sufficient for this purpose.  Hospital accepts no responsibility if the 
information is made available to any other person, INCLUDING THE PATIENT.



Interpretation Summary

  *  Name: ASHLEY COONEY  Study Date: 2017 02:23 PM  BP: 118/65 mmHg

  *  MRN: [id number]  Patient Location: [facility]  HR: 58

  *  : 1940 (M/d/yyyy)  Gender: Female  Height: 55 in

  *  Age: 76 yrs  Ethnicity: AS  Weight: 73 lb

  *  Ordering Physician: Meseret Mccullough

  *  Performed By: Christy Solis RDCS

  *  Accession# [id number]  Account# [id number]

  *  Reason For Study: NEAR SYNCOPE

  *  BSA: 1.1 m2

  *  -- Conclusions --

  *  1. Small left ventricular size with hyperdynamic systolic function.  EF > 70%.  No regional 
wall motion abnormalities.  Mild left ventricular hypertrophy.  Type 2 diastolic dysfunction.

  *  2. There is mild mitral regurgitation.

  *  3. There is mild to moderate tricuspid regurgitation.

  *  4. Normal estimated right ventricular systolic pressure; 30 mmHg.

  *  5. No prior study available for comparison.

Procedure Details

  *  A complete two-dimensional transthoracic echocardiogram was performed (2D, M-mode, Doppler and 
color flow Doppler).

Left Ventricle

  *  Small left ventricular size with hyperdynamic systolic function.  EF > 70%.  No regional wall 
motion abnormalities.  Mild left ventricular hypertrophy.

Right Ventricle

  *  The right ventricle is normal in size and function.

  *  The right ventricular systolic function is normal as assessed by tricuspid annular plane 
systolic excursion (TAPSE) (normal >1.5 cm).

Atria

  *  The left atrial size is normal.

  *  Right atrial size is normal.

  *  There is no evidence of atrial septal defect, but resolution does not allow assessment for a 
patent foramen ovale.

Mitral Valve

  *  The mitral valve is grossly normal.

  *  There is no mitral valve stenosis.

  *  There is mild mitral regurgitation.

Tricuspid Valve

  *  The tricuspid valve is not well visualized, but is grossly normal.

  *  There is no tricuspid stenosis.

  *  There is mild to moderate tricuspid regurgitation.

Aortic Valve

  *  The aortic valve is trileaflet.

  *  No hemodynamically significant valvular aortic stenosis.

  *  No aortic regurgitation is present.

Pulmonic Valve

  *  The pulmonary valve is inadequately visualized, but the Doppler data is adequate for 
interpretation.

Great Vessels

  *  The aortic root is normal size.

  *  Ascending aorta of normal dimension

Pericardium/Pleural

  *  There is no pericardial effusion.

Great Vessels

  *  Normal inferior vena cava size and collapsability with sniff indicates a normal right atrial 
pressure of 3 mmHg

Left Ventricular Diastolic Function

  *  Diastolic dysfunction, Grade II (pseudonormalization pattern).



MMode 2D Measurements and Calculations

IVSd 1.2 cm

IVSs 1.7 cm



LVIDd 2.7 cm

LVIDs 1.7 cm

LVPWd 1.0 cm

LVPWs 1.1 cm



IVS/LVPW 1.1 

FS 37.3 %

EDV(Teich) 26.0 ml

ESV(Teich) 8.0 ml

EF(Teich) 69.3 %



EDV(cubed) 18.8 ml

ESV(cubed) 4.6 ml

EF(cubed) 75.4 %

% IVS thick 48.8 %

% LVPW thick 7.2 %





LV mass(C)d 80.9 grams

LV mass(C)dI 70.8 grams/m\S\2

LV mass(C)s 73.5 grams

LV mass(C)sI 64.3 grams/m\S\2



SV(Teich) 18.0 ml

SI(Teich) 15.8 ml/m\S\2

SV(cubed) 14.1 ml

SI(cubed) 12.4 ml/m\S\2



Ao root diam 2.6 cm

Ao root area 5.4 cm\S\2

ACS 1.3 cm

LA dimension 2.8 cm



asc Aorta Diam 2.0 cm





LA/Ao 1.1 

LVOT diam 1.8 cm

LVOT area 2.6 cm\S\2



LVAd ap4 15.8 cm\S\2

LVLd ap4 6.5 cm

EDV(MOD-sp4) 31.3 ml

EDV(sp4-el) 32.7 ml

LVAs ap4 5.8 cm\S\2

LVLs ap4 4.4 cm

ESV(MOD-sp4) 6.3 ml

ESV(sp4-el) 6.5 ml

EF(MOD-sp4) 79.8 %

EF(sp4-el) 80.2 %



LVAd ap2 20.3 cm\S\2

LVLd ap2 7.2 cm

EDV(MOD-sp2) 46.3 ml

EDV(sp2-el) 48.6 ml

LVAs ap2 8.1 cm\S\2

LVLs ap2 5.2 cm

ESV(MOD-sp2) 11.2 ml

ESV(sp2-el) 10.8 ml

EF(MOD-sp2) 75.9 %

EF(sp2-el) 77.8 %



LVLd %diff 10.3 %

EDV(MOD-bp) 40.2 ml

LVLs %diff 14.8 %

ESV(MOD-bp) 8.9 ml

EF(MOD-bp) 77.8 %





SV(MOD-sp4) 24.9 ml

SI(MOD-sp4) 21.8 ml/m\S\2



SV(MOD-sp2) 35.1 ml

SI(MOD-sp2) 30.8 ml/m\S\2



SV(MOD-bp) 31.3 ml

SI(MOD-bp) 27.4 ml/m\S\2



SV(sp4-el) 26.3 ml

SI(sp4-el) 23.0 ml/m\S\2





SV(sp2-el) 37.8 ml

SI(sp2-el) 33.1 ml/m\S\2













Doppler Measurements and Calculations

MV E max alfie 103.8 cm/sec

MV A max alfie 90.7 cm/sec



MV E/A 1.1 



MV P1/2t max alfie 129.5 cm/sec

MV P1/2t 70.8 msec

MVA(P1/2t) 3.1 cm\S\2

MV dec slope 535.9 cm/sec\S\2

MV dec time 0.26 sec



Ao V2 max 103.8 cm/sec

Ao max PG 4.3 mmHg

Ao max PG (full) 1.5 mmHg

ELOISA(V,A) 2.1 cm\S\2

ELOISA(V,D) 2.1 cm\S\2





LV V1 max PG 2.8 mmHg



LV V1 max 83.9 cm/sec



TV E max alfie 60.6 cm/sec



PA V2 max 67.9 cm/sec

PA max PG 1.8 mmHg





TR max alfie 261.3 cm/sec

RVSP(TR) 30.4 mmHg



RAP systole 3.0 mmHg

## 2017-06-01 NOTE — DIAGNOSTIC IMAGING REPORT
CT OF THE ABDOMEN AND PELVIS WITH CONTRAST



CLINICAL HISTORY: Weight loss. History of multiple abdominal surgeries.    



COMPARISON STUDY:  CT of the abdomen and pelvis August 4, 2014.



TECHNIQUE: Following IV administration of 92 mL of Optiray-320, axial images of

the abdomen and pelvis were obtained from the lung bases to the proximal femurs.

Images were reviewed in the axial, sagittal, and coronal planes. IV contrast was

administered without complication.



CT DOSE: 183.65 mGycm



FINDINGS: Visualized portions of the lower chest demonstrate trace bilateral

pleural effusions. The liver, spleen, adrenal glands, kidneys and pancreas are

unremarkable. There is no biliary ductal dilatation status post cholecystectomy.

There is no hydronephrosis. Nephrograms are symmetric. There is no pneumatosis,

free air or portal venous gas. A small bowel anastomosis within the pelvis is

noted. The appendix is not identified. There is no evidence for a bowel

obstruction. There is no abscess. Wall thickening of the sigmoid colon and

rectum is noted. There may be mild hyperemia. Skeletal structures are

unremarkable. Evaluation is difficult given a paucity of intra-abdominal fat.

There are no suspicious osseous lesions. There is no lymphadenopathy.







IMPRESSION:  



1. Wall thickening of the sigmoid colon and rectum. This is likely due to

underdistention although a nonspecific proctocolitis could appear similar.

Decreased sensitivity for detection of mucosal lesions given CT technique.



2. Trace bilateral pleural effusions.



3. No evidence for a bowel obstruction.







Electronically signed by:  Hayden Eller M.D.

6/1/2017 3:42 PM



Dictated Date/Time:  6/1/2017 3:30 PM

## 2017-06-02 VITALS
HEART RATE: 53 BPM | OXYGEN SATURATION: 99 % | DIASTOLIC BLOOD PRESSURE: 67 MMHG | SYSTOLIC BLOOD PRESSURE: 119 MMHG | TEMPERATURE: 98.06 F

## 2017-06-02 VITALS
HEART RATE: 60 BPM | OXYGEN SATURATION: 98 % | SYSTOLIC BLOOD PRESSURE: 116 MMHG | TEMPERATURE: 98.24 F | DIASTOLIC BLOOD PRESSURE: 68 MMHG

## 2017-06-02 VITALS
DIASTOLIC BLOOD PRESSURE: 67 MMHG | TEMPERATURE: 98.24 F | SYSTOLIC BLOOD PRESSURE: 124 MMHG | HEART RATE: 62 BPM | OXYGEN SATURATION: 99 %

## 2017-06-02 LAB
ALBUMIN SERPL-MCNC: 3.4 G/DL (ref 3.8–4.8)
BETA-2-GLOBULIN: 0.4 G/DL (ref 0.2–0.5)
ELECTROPHORESIS INTERPRET: (no result)
GAMMA GLOB SERPL ELPH-MCNC: 1.1 G/DL (ref 0.8–1.7)
PROT SERPL-MCNC: 6 G/DL (ref 6.2–8.3)

## 2017-06-02 RX ADMIN — Medication SCH TAB: at 08:33

## 2017-06-02 RX ADMIN — SODIUM CHLORIDE SCH MLS/HR: 900 INJECTION, SOLUTION INTRAVENOUS at 12:19

## 2017-06-02 RX ADMIN — OXYCODONE HYDROCHLORIDE AND ACETAMINOPHEN SCH MG: 500 TABLET ORAL at 08:33

## 2017-06-02 RX ADMIN — CHOLESTYRAMINE SCH GM: 4 POWDER, FOR SUSPENSION ORAL at 10:20

## 2017-06-02 RX ADMIN — Medication SCH MCG: at 08:33

## 2017-06-02 RX ADMIN — Medication SCH INTER.UNIT: at 08:33

## 2017-06-02 RX ADMIN — PANTOPRAZOLE SODIUM SCH MLS/MIN: 40 INJECTION, POWDER, FOR SOLUTION INTRAVENOUS at 08:33

## 2017-06-02 NOTE — GASTROENTEROLOGY PROGRESS NOTE
Progress Note


Date of Service:  Jun 2, 2017


Subjective


Pt evaluation today including:  conversation w/ patient, physical exam, chart 

review, lab review, review of studies, review of inpatient medication list


Pt's appetite is fair. Denies any n/v, abd pain. CT abd/pelvis reviewed - 

sigmoid colon thickening likely underdistension instead of colitis. She desires 

to go home today.





Review of Systems


Constitutional:  No fever, No chills


Respiratory:  No cough, No shortness of breath


Cardiac:  No chest pain


Abdomen:  No pain, No nausea, No vomiting





Medications





Current Inpatient Medications








 Medications


  (Trade)  Dose


 Ordered  Sig/Albaro


 Route  Start Time


 Stop Time Status Last Admin


Dose Admin


 


 Miscellaneous


 Information


  (Pharmacist


 Discharge Med Rec


 Consult)  1 ea  UD  PRN


 N/A  5/31/17 19:00


 6/30/17 18:59   


 


 


 Sodium Chloride  1,000 ml @ 


 50 mls/hr  Q20H


 IV  5/31/17 21:30


 6/30/17 21:29  6/1/17 16:02


50 MLS/HR


 


 Acetaminophen


  (Tylenol Tab)  650 mg  Q4H  PRN


 PO  5/31/17 19:00


 6/30/17 18:59  6/1/17 16:03


650 MG


 


 Magnesium


 Hydroxide


  (Milk Of


 Magnesia Susp)  30 ml  Q12H  PRN


 PO  5/31/17 19:00


 6/30/17 18:59   


 


 


 Ondansetron HCl


  (Zofran Inj)  4 mg  Q6H  PRN


 IV  5/31/17 19:00


 6/30/17 18:59  6/1/17 16:03


4 MG


 


 Cholecalciferol


  (Vitamin D Tab)  1,000


 inter.unit  DAILY


 PO  6/1/17 09:00


 7/1/17 08:59  6/2/17 08:33


1,000 INTER.UNIT


 


 Multivitamins/


 Minerals


  (Multivitamin W/


 Minerals Tab)  1 tab  DAILY


 PO  6/1/17 09:00


 7/1/17 08:59  6/2/17 08:33


1 TAB


 


 Ascorbic Acid


  (Vitamin C Tab)  1,000 mg  DAILY


 PO  6/1/17 09:00


 7/1/17 08:59  6/2/17 08:33


1,000 MG


 


 Cholestyramine


 Resin


  (Questran Powder


 Light)  4 gm  BID@1000,2200


 PO  5/31/17 22:00


 6/30/17 21:59  6/2/17 10:20


4 GM


 


 Cyanocobalamin


  (Vitamin B-12


 Tab)  1,000 mcg  DAILY


 PO  6/1/17 09:00


 7/1/17 08:59  6/2/17 08:33


1,000 MCG


 


 Miscellaneous


  (Iv Fluids


 Completed)  1 ea  PRN  PRN


 N/A  5/31/17 20:00


 5/31/18 19:59   


 


 


 Gadobutrol


  (Gadavist)  3 mmol  UD  PRN


 IV  5/31/17 22:15


 6/4/17 22:14   


 


 


 Enteral


 Nutritional


 Formula


  (Boost)  1 can  BID


 PO  6/1/17 21:00


 7/1/17 20:59  6/2/17 08:35


1 CAN


 


 Pantoprazole


 Sodium 40 mg/


 Syringe  10 ml @ 5


 mls/min  DAILY@09,21


 IV  6/1/17 21:00


 7/1/17 20:59  6/2/17 08:33


5 MLS/MIN


 


 Mirtazapine


  (Remeron Tab)  15 mg  HS


 PO  6/1/17 21:00


 7/1/17 20:59  6/1/17 21:22


15 MG











Objective


Vital Signs











  Date Time  Temp Pulse Resp B/P (MAP) Pulse Ox O2 Delivery O2 Flow Rate FiO2


 


6/2/17 08:00      Room Air  


 


6/2/17 07:38 36.8 60 16 116/68 (84) 98 Room Air  


 


6/2/17 00:00 36.8 62 16 124/67 (86) 99 Room Air  


 


6/2/17 00:00      Room Air  


 


6/1/17 18:40 36.6 52 16 127/65 (85) 99 Room Air  


 


6/1/17 16:00      Room Air  


 


6/1/17 15:38 36.7 56 16 131/65 (87) 100 Room Air  


 


6/1/17 12:00      Room Air  











Physical Exam


General Appearance:  no apparent distress, + thin


Eyes:  normal inspection, PERRL, EOMI


Neck:  supple, no JVD, trachea midline


Respiratory/Chest:  normal breath sounds, no respiratory distress, no accessory 

muscle use


Cardiovascular:  regular rate, rhythm, no gallop, no murmur


Abdomen:  non tender, soft, + abnormal bowel sounds (hypoactive)


Extremities:  normal inspection, no pedal edema, no calf tenderness


Neurologic/Psych:  alert, normal mood/affect, oriented x 3


Skin:  normal color, no jaundice, no rash





Assessment and Plan


Patient is a 76 year old female currently seen for abd pain and weight loss. 

Though on eval she denies any issues w abd pain, n/v. Abd exam benign. Her 

weight has been around 76 lbs since 2002, recently dropped to 70 lbs. She 

reports she never weighed more than 90lbs her whole life. Does take up to 2 

cans of Boost for supplements. Admits depressed about her 's PTSD state 

and may not feel like eating or forget to eat. In the past had refused 

endoscopic evaluations. CT w/o acute GI pathology to explain her weight loss 

symptoms. She had met w dietician here w dietary recs. Continues to deny n/v, 

abd pain. Desires to go home today. 





Plans


- Questran as dosed for bile acid diarrhea


- Continue Remeron 15mg qHS


- Offered pt GI f/u w Dr. Jarvis who she had seen in the past but she 

said she's unsure if she wants appt. Will ask our schedulers to contact her in 1

-2 weeks to see if she would like appt. 


- No contraindication for DC.

## 2017-06-02 NOTE — DISCHARGE SUMMARY
Discharge Summary


Date of Service


Jun 2, 2017.





Discharge Summary


Admission Date:


May 31, 2017 at 18:59


Discharge Date:  Jun 2, 2017


Discharge Disposition:  Home


Principal Diagnosis:  headache, nausea and vomiting


Procedures:


CT abd/pelvis, no significant pathology, no SBO





neurologic imaging shows no stroke, no vascular insufficiency


Consultations:


 [facility]





Medication Reconciliation


New Medications:  


Mirtazapine (Mirtazapine) 15 Mg Tab


15 MG PO HS, #30 TAB 6 Refills





 


Continued Medications:  


Alendronate Sodium (Alendronate Sodium) 70 Mg Tab


70 MG PO WK


TAKE THIS MEDICATION EVERY FRIDAY


Ascorbic Acid (Ascorbic Acid) 1,000 Mg Tab


1000 MG PO DAILY





Butalbital-Acetaminophen-Caffe (Fioricet) 1 Cap Cap


1 CAP PO TID PRN for Headache





Cholecalciferol (Vitamin D 1000 Unit) 1,000 Unit Cap


1000 INTER.UNIT PO DAILY, CAP





Cholestyramine (Cholestyramine) 4 Gm Pow


4 GM PO BID


MIX THE CONTENTS OF ONE POWDER PACKET WITH 2 TO 6 OUNCES ON


 NONCARBONATED BEVERAGE


Cyanocobalamin (Vitamin B12) 1,000 Mcg Tab


1000 MCG PO DAILY





Estradiol (Estradiol Transdermal System) 0.05 Mg/24 Hr Dis


1 PATCH TOP WK


REMOVE PATCH AND APPLY NEW PATCH EVERY SATURDAY


Meloxicam (Mobic) 7.5 Mg Tab


7.5 MG PO DAILY PRN for Pain





Multiple Vitamins W/ Minerals (Womens 50+ Multi Vitamin) 1 Tab Tab


1 TAB PO DAILY





 


Discontinued Medications:  


Methocarbamol (Methocarbamol) 750 Mg Tab


375 MG PO HS PRN for Muscle Relaxer











Discharge Exam


Review of Systems:  


   Constitutional:  No fever, No chills


   Neurologic:  + weakness, No memory loss, No paralysis


   Psychiatric:  + depression symptoms, + anxiety


Physical Exam:  


   General Appearance:  WD/WN, no apparent distress, + thin


   Eyes:  PERRL, EOMI


   Neurologic/Psychiatric:  alert, oriented x 3





Hospital Course


76 F with weakness headache emesis and nausea





PT has negative evaluation for stroke or neurologic origin of headache and 

emesis, including imaging of brain and cerebral circulation.





nausea, questionable etiology,  [facility] recommends remeron, will start at 

15 hs with titration to 30 by outpt providers





weight loss, does have mildly elevated protein level, maybe from dehydration, 

no abnormal differential but pending SPEP


Total Time Spent:  Greater than 30 minutes


This includes examination of the patient, discharge planning, medication 

reconciliation, and communication with other providers.





Discharge Instructions


Please refer to the electronic Patient Visit Report (Discharge Instructions) 

for additional information.

## 2017-06-02 NOTE — DISCHARGE INSTRUCTIONS
Discharge Instructions


Date of Service


Jun 2, 2017.





Admission


Reason for Admission:  Stroke-Like Symptoms





Discharge


Discharge Diagnosis / Problem:  headache





Discharge Goals


Goal(s):  Diagnostic testing, Therapeutic intervention





Activity Recommendations


Activity Limitations:  resume your previous activity





.





Current Hospital Diet


Patient's current hospital diet: Regular Diet





Discharge Diet


Recommended Diet:  Regular Diet





Pending Studies


Studies pending at discharge:  no





Laboratory Results





Hemoglobin A1c








Test


  6/1/17


05:45 Range/Units


 


 


Estimated Average Glucose 100   mg/dl


 


Hemoglobin A1c 5.1  4.5-5.6  %








Lipid Panel








Test


  6/1/17


05:45 Range/Units


 


 


Triglycerides Level 76  0-150  mg/dl


 


Cholesterol Level 131  0-200  mg/dl


 


HDL Cholesterol 68   mg/dl


 


Cholesterol/HDL Ratio 1.9   


 


LDL Cholesterol, Calculated 48   mg/dl











Medical Emergencies








.


Who to Call and When:





Medical Emergencies:  If at any time you feel your situation is an emergency, 

please call 911 immediately.





.





Non-Emergent Contact


Non-Emergency issues call your:  Primary Care Provider, Gastroenterologist


Call Non-Emergent contact if:  temperature is above 101, your pain is unusual 

for you





.


.








"Provider Documentation" section prepared by Hoang Vieyra.








.





VTE Core Measure


Inpt VTE Proph given/why not?:  Treatment not indicated

## 2017-06-08 NOTE — EDITING REQUIRED CODING QUERY
SUPPORTING DIAGNOSIS NEEDED



Dr. Mccullough,



A supporting diagnosis is required for the test/procedure performed on this patient in 
order for us to be reimbursed by the patient's insurance. Please provide a supporting 
diagnosis for the following test/procedure listed below next to the test name along with 
your signature. 



*If there is no additional diagnosis for this patient that would support the following 
test/procedure please document that below next to the test/procedure.



Test(s)/Procedure(s) that require a supporting diagnosis:





* ([id number]) (CAROTID DOP) DUPLEX NECK ARTER      DIAGNOSIS: rule out stroke





***DATE OF SERVICE: 5/31/17***





Provider Signature:  ____Meseret Mccullough, DO__________________________  Date:  
__6/25/17_____



Thank you  

Alfredo Verduzco

[facility] Information Management

Phone:  [phone]

Fax:  [phone]



Once completed, please kindly fax back to [phone]



For questions please call [phone]

## 2017-07-12 ENCOUNTER — HOSPITAL ENCOUNTER (OUTPATIENT)
Dept: HOSPITAL 45 - C.LABPBG | Age: 77
Discharge: HOME | End: 2017-07-12
Attending: FAMILY MEDICINE
Payer: COMMERCIAL

## 2017-07-12 DIAGNOSIS — R63.4: ICD-10-CM

## 2017-07-12 DIAGNOSIS — R63.0: ICD-10-CM

## 2017-07-12 DIAGNOSIS — K91.2: Primary | ICD-10-CM

## 2017-07-12 DIAGNOSIS — E55.9: ICD-10-CM

## 2017-07-12 LAB
ALBUMIN/GLOB SERPL: 1 {RATIO} (ref 0.9–2)
ALP SERPL-CCNC: 57 U/L (ref 45–117)
ALT SERPL-CCNC: 25 U/L (ref 12–78)
ANION GAP SERPL CALC-SCNC: 8 MMOL/L (ref 3–11)
AST SERPL-CCNC: 19 U/L (ref 15–37)
BUN SERPL-MCNC: 11 MG/DL (ref 7–18)
BUN/CREAT SERPL: 13.1 (ref 10–20)
CALCIUM SERPL-MCNC: 9 MG/DL (ref 8.5–10.1)
CHLORIDE SERPL-SCNC: 111 MMOL/L (ref 98–107)
CO2 SERPL-SCNC: 23 MMOL/L (ref 21–32)
CREAT SERPL-MCNC: 0.8 MG/DL (ref 0.6–1.2)
GLOBULIN SER-MCNC: 3.7 GM/DL (ref 2.5–4)
GLUCOSE SERPL-MCNC: 87 MG/DL (ref 70–99)
POTASSIUM SERPL-SCNC: 3.7 MMOL/L (ref 3.5–5.1)
SODIUM SERPL-SCNC: 142 MMOL/L (ref 136–145)
TSH SERPL-ACNC: 0.91 UIU/ML (ref 0.3–4.5)

## 2017-07-28 ENCOUNTER — HOSPITAL ENCOUNTER (OUTPATIENT)
Dept: HOSPITAL 45 - C.CTS | Age: 77
Discharge: HOME | End: 2017-07-28
Attending: INTERNAL MEDICINE
Payer: COMMERCIAL

## 2017-07-28 DIAGNOSIS — R41.3: Primary | ICD-10-CM

## 2017-07-28 NOTE — DIAGNOSTIC IMAGING REPORT
HEAD WITHOUT CONTRAST (CT)



CT DOSE: 638.56 mGycm



HISTORY: Mental status change  R41.3 Memory [id number]



TECHNIQUE: Multiaxial CT images of the head were performed without the use of

intravenous contrast.  A dose lowering technique was utilized adhering to the

principles of ALARA.





Comparison: 5/31/2017



Findings: The paranasal sinuses and mastoid air cells are clear. Frontal atrophy

unchanged. Mild chronic small vessel change of aging. No acute intracranial

hemorrhage. The ventricular system is midline. There is bilateral calcification

of the basal ganglia considered chronic.



Impression:

Chronic change. No acute process. No change from the prior exam. 











The above report was generated using voice recognition software.  It may contain

grammatical, syntax or spelling errors.







Electronically signed by:  Marc Falk M.D.

7/28/2017 4:08 PM



Dictated Date/Time:  7/28/2017 4:06 PM

## 2018-02-13 ENCOUNTER — HOSPITAL ENCOUNTER (EMERGENCY)
Dept: HOSPITAL 45 - C.EDB | Age: 78
LOS: 1 days | Discharge: HOME | End: 2018-02-14
Payer: COMMERCIAL

## 2018-02-13 VITALS — TEMPERATURE: 97.88 F

## 2018-02-13 VITALS
HEIGHT: 60 IN | WEIGHT: 76.28 LBS | BODY MASS INDEX: 14.98 KG/M2 | HEIGHT: 60 IN | BODY MASS INDEX: 14.98 KG/M2 | WEIGHT: 76.28 LBS

## 2018-02-13 DIAGNOSIS — R51: ICD-10-CM

## 2018-02-13 DIAGNOSIS — G30.9: ICD-10-CM

## 2018-02-13 DIAGNOSIS — Z88.6: ICD-10-CM

## 2018-02-13 DIAGNOSIS — Z79.899: ICD-10-CM

## 2018-02-13 DIAGNOSIS — Z88.5: ICD-10-CM

## 2018-02-13 DIAGNOSIS — F02.80: ICD-10-CM

## 2018-02-13 DIAGNOSIS — R10.9: ICD-10-CM

## 2018-02-13 DIAGNOSIS — R11.2: Primary | ICD-10-CM

## 2018-02-13 DIAGNOSIS — M81.0: ICD-10-CM

## 2018-02-13 DIAGNOSIS — Z87.19: ICD-10-CM

## 2018-02-13 DIAGNOSIS — Z88.2: ICD-10-CM

## 2018-02-13 DIAGNOSIS — Z88.1: ICD-10-CM

## 2018-02-13 DIAGNOSIS — R19.7: ICD-10-CM

## 2018-02-14 VITALS — HEART RATE: 66 BPM | DIASTOLIC BLOOD PRESSURE: 48 MMHG | OXYGEN SATURATION: 98 % | SYSTOLIC BLOOD PRESSURE: 102 MMHG

## 2018-02-14 LAB
ALBUMIN SERPL-MCNC: 4.1 GM/DL (ref 3.4–5)
ALP SERPL-CCNC: 80 U/L (ref 45–117)
ALT SERPL-CCNC: 30 U/L (ref 12–78)
AST SERPL-CCNC: 27 U/L (ref 15–37)
BASOPHILS # BLD: 0.02 K/UL (ref 0–0.2)
BASOPHILS NFR BLD: 0.2 %
BUN SERPL-MCNC: 9 MG/DL (ref 7–18)
CALCIUM SERPL-MCNC: 9 MG/DL (ref 8.5–10.1)
CO2 SERPL-SCNC: 21 MMOL/L (ref 21–32)
CREAT SERPL-MCNC: 0.93 MG/DL (ref 0.6–1.2)
EOS ABS #: 0.08 K/UL (ref 0–0.5)
EOSINOPHIL NFR BLD AUTO: 236 K/UL (ref 130–400)
GLUCOSE SERPL-MCNC: 186 MG/DL (ref 70–99)
HCT VFR BLD CALC: 37.9 % (ref 37–47)
HGB BLD-MCNC: 13.4 G/DL (ref 12–16)
IG#: 0.03 K/UL (ref 0–0.02)
IMM GRANULOCYTES NFR BLD AUTO: 11.2 %
LIPASE: 247 U/L (ref 73–393)
LYMPHOCYTES # BLD: 1.09 K/UL (ref 1.2–3.4)
MCH RBC QN AUTO: 34.3 PG (ref 25–34)
MCHC RBC AUTO-ENTMCNC: 35.4 G/DL (ref 32–36)
MCV RBC AUTO: 96.9 FL (ref 80–100)
MONO ABS #: 0.65 K/UL (ref 0.11–0.59)
MONOCYTES NFR BLD: 6.7 %
NEUT ABS #: 7.82 K/UL (ref 1.4–6.5)
NEUTROPHILS # BLD AUTO: 0.8 %
NEUTROPHILS NFR BLD AUTO: 80.8 %
PMV BLD AUTO: 9.3 FL (ref 7.4–10.4)
POTASSIUM SERPL-SCNC: 3.3 MMOL/L (ref 3.5–5.1)
PROT SERPL-MCNC: 8.5 GM/DL (ref 6.4–8.2)
RED CELL DISTRIBUTION WIDTH CV: 13.1 % (ref 11.5–14.5)
RED CELL DISTRIBUTION WIDTH SD: 45.6 FL (ref 36.4–46.3)
SODIUM SERPL-SCNC: 137 MMOL/L (ref 136–145)
WBC # BLD AUTO: 9.69 K/UL (ref 4.8–10.8)

## 2018-02-14 NOTE — DIAGNOSTIC IMAGING REPORT
CT OF THE ABDOMEN AND PELVIS WITH CONTRAST



CLINICAL HISTORY: Vomiting. History of small bowel obstruction.    



COMPARISON STUDY:  CT of the abdomen and pelvis June 1, 2017.



TECHNIQUE: Following IV administration of 70 mL of Optiray-320, axial images of

the abdomen and pelvis were obtained from the lung bases to the proximal femurs.

Images were reviewed in the axial, sagittal, and coronal planes. IV contrast was

administered without complication.  A dose lowering technique was utilized

adhering to the principles of ALARA.





CT DOSE: 231.83 mGy.cm



FINDINGS: The liver, spleen, adrenal glands, kidneys and pancreas are

unremarkable. There is no pancreatic ductal dilatation. There is no

peripancreatic infiltration. Moderate dilatation of the common bile duct has

developed. This is likely related to prior cholecystectomy. There is no

hydronephrosis. Apparent wall thickening of the distal stomach is likely due to

underdistention. There is wall thickening of the left colon. A small bowel

anastomosis within the pelvis is noted. There is no evidence for a bowel

obstruction. There is no pneumatosis, free air or portal venous gas. There is

trace gas within the bladder. No suspicious osseous lesions are present.







IMPRESSION:  



1. No evidence for a bowel obstruction.



2. Left colon wall thickening which is likely due to underdistention. A

nonspecific colitis could appear similar.



3. Moderate biliary ductal dilatation which is likely related to prior

cholecystectomy however could be correlated with obstructive liver function

tests.



4. Trace gas within the bladder which could be correlated with recent

instrumentation.







Electronically signed by:  Hayden Eller M.D.

2/14/2018 7:01 AM



Dictated Date/Time:  2/14/2018 6:49 AM

## 2018-02-14 NOTE — DIAGNOSTIC IMAGING REPORT
HEAD CT NONCONTRAST



CT DOSE: 537.48 mGy.cm



HISTORY:      falls, vomiting



TECHNIQUE: Multiaxial CT images of the head were performed without the use of

intravenous contrast. Automated exposure control was utilized for this study.  A

dose lowering technique was utilized adhering to the principles of ALARA.



Comparison: Head CT 7/20/2017.



Findings: The paranasal sinuses and mastoid air cells are clear. The calvarium

and skull base are intact. There is no mass, hematoma, midline shift, acute

infarct. White matter hypodensity is nonspecific but suggestive of microvascular

ischemic change. The ventricles and sulci demonstrate mild age-related

involutional changes.



Impression:

No significant change compared to the prior study. No acute intracranial

abnormality. 







Electronically signed by:  Preston Matute M.D.

2/14/2018 7:08 AM



Dictated Date/Time:  2/14/2018 7:04 AM

## 2018-02-14 NOTE — EMERGENCY ROOM VISIT NOTE
History


Report prepared by Lori:  Yarely Barr


Under the Supervision of:  Dr. Nora Lemos M.D.


First contact with patient:  23:22


Chief Complaint:  VOMITING


Stated Complaint:  VOMITING/DIARRHEA/HEADACHE





History of Present Illness


The patient is a 77 year old female who presents to the Emergency Room with 

complaints of sudden vomiting starting this evening. The patient's daughter 

states that she has Alzheimer and that she lives with her  who has PTSD. 

She states that the patient's  found her lying on the floor. She states 

that no one knows what happened. The patient denies remembering falling. She 

states that she does remember eating a little cake and V8 energy drink for 

dinner. She states that she laid down for bed and then decided to get some 

water. She reports that directly after drinking water she started vomiting this 

brownish orange. The patient complains of diarrhea, abdominal pain, and a 

headache. The patient's daughter notes a history of osteoporosis, short bowel 

syndrome, and problems with her nutrition. The daughter notes that she is 

unsure if her mother is telling the truth because she usually forgets to eat.





   Source of History:  patient, family


   Onset:  this evening


   Position:  other (global)


   Quality:  other (illness)


   Timing:  other (sudden)


   Associated Symptoms:  + headache, + abdominal pain, + diarrhea





Review of Systems


See HPI for pertinent positives & negatives. A total of 10 systems reviewed and 

were otherwise negative.





Past Medical & Surgical


Medical Problems:


(1) Abdominal adhesions


(2) Biliary colic


(3) Cholelithiasis


(4) SBO (small bowel obstruction)


Surgical Problems:


(1) S/P appendectomy


(2) S/P hysterectomy








Family History





Patient reports no known family medical history.





Social History


Marital Status:  


Housing Status:  lives with significant other


Occupation Status:  retired





Current/Historical Medications


Scheduled


Alendronate Sodium (Alendronate Sodium), 70 MG PO WK


Ascorbic Acid (Ascorbic Acid), 1,000 MG PO DAILY


Calcium Carbonate-Vitamin D (Oscal 500/200 D-3), 1 TAB PO DAILY


Cholecalciferol (Vitamin D3), 5,000 UNIT PO DAILY


Cholestyramine (Cholestyramine), 4 GM PO BID


Dicyclomine Hcl (Dicyclomine Hcl), 10 MG PO BID


Estradiol (Estradiol Transdermal System), 1 PATCH TOP WK


Mirtazapine (Remeron), 30 MG PO HS


Multiple Vitamins W/ Minerals (Womens 50+ Multi Vitamin), 1 TAB PO DAILY


Omeprazole (Prilosec), 20 MG PO DAILY


Ondasetron Odt (Zofran Odt), 4 MG SL Q6H





Scheduled PRN


Butalbital-Acetaminophen-Caffe (Fioricet), 1 CAP PO TID PRN for Headache





Allergies


Coded Allergies:  


     Tetracycline (Verified  Allergy, Mild, UNKNOWN, 10/10/14)


     Codeine (Verified  Allergy, Unknown, UNKNOWN, 10/10/14)


     Sulfa Drugs (Verified  Allergy, Unknown, UNKNOWN, 10/10/14)


     Tramadol (Verified  Allergy, Unknown, UNKNOWN, 10/10/14)


     Aspirin (Verified  Adverse Reaction, Mild, GI UPSET, 10/2/14)


     NSAIDs (Verified  Adverse Reaction, Mild, GI UPSET, 10/10/14)





Physical Exam


Vital Signs











  Date Time  Temp Pulse Resp B/P (MAP) Pulse Ox O2 Delivery O2 Flow Rate FiO2


 


2/14/18 03:14  66 16 102/48 98   


 


2/14/18 01:07  94 20 136/64 100 Room Air  


 


2/13/18 23:42  72      


 


2/13/18 23:26 36.6 77 20 163/69 100 Room Air  











Physical Exam


Vital signs reviewed.





General: Well-appearing, elderly, in no significant distress. Actively vomiting 

a reddish tinged fluid. Thin and frail.





HEENT: No scleral icterus, PERRLA, neck supple.  Atraumatic. Dry mucus 

membranes.





Cardiovascular: Regular rate and rhythm, no extra sounds.





Pulmonary: Clear to auscultation bilaterally, normal work of breathing.





Abdomen: Soft, mild diffuse tenderness, no rebound, mild guarding, positive 

tympany to percussion, nondistended, positive bowel sounds. Well healed 

incision noted to the midline of the abdomen vertically.





Musculoskeletal: Atraumatic, no peripheral edema.





Neurologic: Patient awake alert and pleasantly confused.  Does answer questions 

somewhat appropriately.  At baseline per daughter.





Skin: Warm, dry, no rash





Medical Decision & Procedures


ER Provider


Diagnostic Interpretation:


Radiology results as stated below per my review and radiologist interpretation:





CT ABDOMEN & PELVIS With Contrast:





Nonobstructive bowel gas pattern.


Previous bowel surgery.





Colon is under distended and may be mildly thickened. Correlate with GI 

symptoms.


Appendix is not identified.


The stomach is under distended and not well assessed.





Cholecystectomy and mild biliary ductal dilatation/ectasia. 





Trace focus of air in the bladder. Could be from recent instrumentation or 

infection with gas forming organism. 





Hysterectomy.





Degenerative changes in the spine with associated central canal and foramina 

stenoses.





Radiologist: Manal Schoellerman, MD





Study ready at 01:25 and initial results transmitted at 01:59.








CT HEAD:





No intracranial hemorrhage or skill fracture.





Involutional changes with small vessel disease.





Radiologist: Manal Schoellerman, MD





Study ready at 01:25 and initial results transmitted at 01:52.





Laboratory Results


2/13/18 23:40








Red Blood Count 3.91, Mean Corpuscular Volume 96.9, Mean Corpuscular Hemoglobin 

34.3, Mean Corpuscular Hemoglobin Concent 35.4, Mean Platelet Volume 9.3, 

Neutrophils (%) (Auto) 80.8, Lymphocytes (%) (Auto) 11.2, Monocytes (%) (Auto) 

6.7, Eosinophils (%) (Auto) 0.8, Basophils (%) (Auto) 0.2, Neutrophils # (Auto) 

7.82, Lymphocytes # (Auto) 1.09, Monocytes # (Auto) 0.65, Eosinophils # (Auto) 

0.08, Basophils # (Auto) 0.02





2/13/18 23:40

















Test


  2/13/18


23:40 2/14/18


00:35 2/14/18


02:44


 


White Blood Count


  9.69 K/uL


(4.8-10.8) 


  


 


 


Red Blood Count


  3.91 M/uL


(4.2-5.4) 


  


 


 


Hemoglobin


  13.4 g/dL


(12.0-16.0) 


  


 


 


Hematocrit 37.9 % (37-47)   


 


Mean Corpuscular Volume


  96.9 fL


() 


  


 


 


Mean Corpuscular Hemoglobin


  34.3 pg


(25-34) 


  


 


 


Mean Corpuscular Hemoglobin


Concent 35.4 g/dl


(32-36) 


  


 


 


Platelet Count


  236 K/uL


(130-400) 


  


 


 


Mean Platelet Volume


  9.3 fL


(7.4-10.4) 


  


 


 


Neutrophils (%) (Auto) 80.8 %   


 


Lymphocytes (%) (Auto) 11.2 %   


 


Monocytes (%) (Auto) 6.7 %   


 


Eosinophils (%) (Auto) 0.8 %   


 


Basophils (%) (Auto) 0.2 %   


 


Neutrophils # (Auto)


  7.82 K/uL


(1.4-6.5) 


  


 


 


Lymphocytes # (Auto)


  1.09 K/uL


(1.2-3.4) 


  


 


 


Monocytes # (Auto)


  0.65 K/uL


(0.11-0.59) 


  


 


 


Eosinophils # (Auto)


  0.08 K/uL


(0-0.5) 


  


 


 


Basophils # (Auto)


  0.02 K/uL


(0-0.2) 


  


 


 


RDW Standard Deviation


  45.6 fL


(36.4-46.3) 


  


 


 


RDW Coefficient of Variation


  13.1 %


(11.5-14.5) 


  


 


 


Immature Granulocyte % (Auto) 0.3 %   


 


Immature Granulocyte # (Auto)


  0.03 K/uL


(0.00-0.02) 


  


 


 


Anion Gap


  11.0 mmol/L


(3-11) 


  


 


 


Est Creatinine Clear Calc


Drug Dose 28.1 ml/min 


  


  


 


 


Estimated GFR (


American) 69.2 


  


  


 


 


Estimated GFR (Non-


American 59.7 


  


  


 


 


BUN/Creatinine Ratio 10.0 (10-20)   


 


Calcium Level


  9.0 mg/dl


(8.5-10.1) 


  


 


 


Total Bilirubin


  0.5 mg/dl


(0.2-1) 


  


 


 


Direct Bilirubin


  0.1 mg/dl


(0-0.2) 


  


 


 


Aspartate Amino Transf


(AST/SGOT) 27 U/L (15-37) 


  


  


 


 


Alanine Aminotransferase


(ALT/SGPT) 30 U/L (12-78) 


  


  


 


 


Alkaline Phosphatase


  80 U/L


() 


  


 


 


Troponin I


  < 0.015 ng/ml


(0-0.045) 


  


 


 


Total Protein


  8.5 gm/dl


(6.4-8.2) 


  


 


 


Albumin


  4.1 gm/dl


(3.4-5.0) 


  


 


 


Lipase


  247 U/L


() 


  


 


 


Urine Color  YELLOW  


 


Urine Appearance  CLOUDY (CLEAR)  


 


Urine pH  5.0 (4.5-7.5)  


 


Urine Specific Gravity


  


  1.013


(1.000-1.030) 


 


 


Urine Protein  NEG (NEG)  


 


Urine Glucose (UA)  NEG (NEG)  


 


Urine Ketones  1+ (NEG)  


 


Urine Occult Blood  NEG (NEG)  


 


Urine Nitrite  NEG (NEG)  


 


Urine Bilirubin  NEG (NEG)  


 


Urine Urobilinogen  NEG (NEG)  


 


Urine Leukocyte Esterase  MODERATE (NEG)  


 


Urine WBC (Auto)


  


  5-10 /hpf


(0-5) 


 


 


Urine RBC (Auto)  0-4 /hpf (0-4)  


 


Urine Hyaline Casts (Auto)  0 /lpf (0-5)  


 


Urine Epithelial Cells (Auto)  >30 /lpf (0-5)  


 


Urine Bacteria (Auto)  NEG (NEG)  


 


Urine Pathogenic Casts   /lpf (0)  


 


Gastric Fluid pH   3 


 


Gastric Fluid Occult Blood   POS (NEG) 





Laboratory results per my review.





Medications Administered











 Medications


  (Trade)  Dose


 Ordered  Sig/Albaro


 Route  Start Time


 Stop Time Status Last Admin


Dose Admin


 


 Sodium Chloride  250 ml @ 


 999 mls/hr  Q16M STAT


 IV  2/14/18 00:07


 2/14/18 00:22 DC 2/14/18 00:15


999 MLS/HR


 


 Sodium Chloride  1,000 ml @ 


 125 mls/hr  Q8H STAT


 IV  2/14/18 00:07


 2/14/18 08:06  2/14/18 00:07


125 MLS/HR


 


 Ondansetron HCl


  (Zofran Inj)  4 mg  NOW  STAT


 IV  2/14/18 00:07


 2/14/18 00:10 DC 2/14/18 00:07


4 MG


 


 Ondansetron HCl


  (ZOFRAN ODT 4MG


 Home Pack)  1 homepack  UD  ONCE


 PO  2/14/18 03:15


 2/14/18 03:16 DC 2/14/18 03:12


1 HOMEPACK











ECG


Indication:  vomiting


Rate (beats per minute):  71


Rhythm:  normal sinus


Findings:  no acute ischemic change, prolonged QT (prlonged QT-c of 528), other 

(left anterior vesicular block, liekly previous inferior infarct, possible 

previous anterior infarct)


Change:


Patient's electrocardiogram interpreted by me.





ED Course


0006: Past medical records reviewed. The patient was evaluated in room B2. A 

complete history and physical examination was performed. 





0007: Ordered Zofran Inj 4 mg IV, NSS 1000 ml @ 125 mls/hr IV,  ml @ 999 

mls/hr IV.





0252: Upon reevaluation, the patient appeared to have improvement of her 

symptoms. I discussed findings with her and her family. They verbalized 

agreement of the treatment plan. The patient was discharged home.





0315: Ordered Ondansetron HCl 1 homepack PO.





Medical Decision


Differential diagnosis:


Etiologies such as gastroenteritis, food borne illness, infections, appendicitis

, diverticulitis, inflammatory bowel disease, obstruction, GI bleed, biliary 

pathology, as well as others were entertained.





This patient was evaluated and appeared to be in significant discomfort.  

Patient was retching a blood-tinged emesis.  Patient's physical examination is 

concerning for a mild tympany to the abdomen on percussion.  She does have some 

tenderness.  CT scan of the abdomen and pelvis was performed to obstruction 

given the patient's extensive surgical history.  This study is negative.  CT 

scan of the head reveals no evidence of acute intracranial abnormality.  

Patient was hydrated with normal saline solution, given IV Zofran with 

significant resolution of symptoms.  UA is negative.  The patient is medically 

stable at this time.  I do not think she is suffering a significant GI bleed.  

The patient has been having some increasing memory difficulties per family's 

account.  Case management was asked to evaluate the patient and family.  They 

have requested wheelchair transport home to her  for whom they've 

contacted.  The patient was discharged with Zofran ODT to be used as needed for 

nausea.  I suspect a viral etiology to the patient's symptoms today.  They will 

follow-up with their physician for reevaluation and return to the ER for 

worsening of symptoms or any medical concerns.





Medication Reconcilliation


Current Medication List:  was personally reviewed by me





Blood Pressure Screening


Patient's blood pressure:  Elevated blood pressure


Blood pressure disposition:  Elevated BP felt to be situational





Impression





 Primary Impression:  


 Nausea & vomiting





Scribe Attestation


The scribe's documentation has been prepared under my direction and personally 

reviewed by me in its entirety. I confirm that the note above accurately 

reflects all work, treatment, procedures, and medical decision making performed 

by me.





Departure Information


Dispostion


Home / Self-Care





Prescriptions





Ondasetron Odt (ZOFRAN ODT) 4 Mg Tab


4 MG SL Q6H for Nausea, #10 TAB


   Prov: Nora Lemos M.D.         2/14/18





Forms


HOME CARE DOCUMENTATION FORM,                                                 

               IMPORTANT VISIT INFORMATION





Patient Instructions


My [facility]





Additional Instructions





Diagnosis: Vomiting





Encourage plenty of clear fluids.





Advance the diet slowly as tolerated: BRAT diet.  Bananas, rice, applesauce and 

toast.





Zofran 4 mg ODT every 6 hours as needed for nausea.





Follow-up with your physician for reevaluation this week if symptoms persist.





Return to the ER for worsening of symptoms or any medical concerns.